# Patient Record
Sex: MALE | Race: WHITE | NOT HISPANIC OR LATINO | Employment: OTHER | URBAN - METROPOLITAN AREA
[De-identification: names, ages, dates, MRNs, and addresses within clinical notes are randomized per-mention and may not be internally consistent; named-entity substitution may affect disease eponyms.]

---

## 2024-02-05 ENCOUNTER — LAB (OUTPATIENT)
Dept: LAB | Facility: CLINIC | Age: 59
End: 2024-02-05
Payer: MEDICARE

## 2024-02-05 ENCOUNTER — OFFICE VISIT (OUTPATIENT)
Dept: HEMATOLOGY/ONCOLOGY | Facility: CLINIC | Age: 59
End: 2024-02-05
Payer: MEDICARE

## 2024-02-05 VITALS
TEMPERATURE: 98.4 F | SYSTOLIC BLOOD PRESSURE: 163 MMHG | HEIGHT: 69 IN | OXYGEN SATURATION: 97 % | WEIGHT: 171.74 LBS | DIASTOLIC BLOOD PRESSURE: 83 MMHG | HEART RATE: 79 BPM | BODY MASS INDEX: 25.44 KG/M2 | RESPIRATION RATE: 16 BRPM

## 2024-02-05 DIAGNOSIS — K21.00 GASTROESOPHAGEAL REFLUX DISEASE WITH ESOPHAGITIS WITHOUT HEMORRHAGE: ICD-10-CM

## 2024-02-05 DIAGNOSIS — E06.4 DRUG-INDUCED THYROIDITIS: ICD-10-CM

## 2024-02-05 DIAGNOSIS — N28.9 KIDNEY LESION, NATIVE, LEFT: ICD-10-CM

## 2024-02-05 DIAGNOSIS — I10 PRIMARY HYPERTENSION: ICD-10-CM

## 2024-02-05 DIAGNOSIS — C01 CANCER OF BASE OF TONGUE (MULTI): ICD-10-CM

## 2024-02-05 DIAGNOSIS — C01 CANCER OF BASE OF TONGUE (MULTI): Primary | ICD-10-CM

## 2024-02-05 DIAGNOSIS — R97.0 ELEVATED CARCINOEMBRYONIC ANTIGEN (CEA): ICD-10-CM

## 2024-02-05 LAB
ALBUMIN SERPL BCP-MCNC: 4.6 G/DL (ref 3.4–5)
ALP SERPL-CCNC: 28 U/L (ref 33–120)
ALT SERPL W P-5'-P-CCNC: 10 U/L (ref 10–52)
ANION GAP SERPL CALC-SCNC: 12 MMOL/L (ref 10–20)
AST SERPL W P-5'-P-CCNC: 14 U/L (ref 9–39)
BASOPHILS # BLD AUTO: 0.03 X10*3/UL (ref 0–0.1)
BASOPHILS NFR BLD AUTO: 0.3 %
BILIRUB SERPL-MCNC: 0.5 MG/DL (ref 0–1.2)
BUN SERPL-MCNC: 13 MG/DL (ref 6–23)
CALCIUM SERPL-MCNC: 9.9 MG/DL (ref 8.6–10.3)
CHLORIDE SERPL-SCNC: 99 MMOL/L (ref 98–107)
CO2 SERPL-SCNC: 30 MMOL/L (ref 21–32)
CREAT SERPL-MCNC: 1.59 MG/DL (ref 0.5–1.3)
EGFRCR SERPLBLD CKD-EPI 2021: 50 ML/MIN/1.73M*2
EOSINOPHIL # BLD AUTO: 0.23 X10*3/UL (ref 0–0.7)
EOSINOPHIL NFR BLD AUTO: 2.6 %
ERYTHROCYTE [DISTWIDTH] IN BLOOD BY AUTOMATED COUNT: 12.5 % (ref 11.5–14.5)
GLUCOSE SERPL-MCNC: 87 MG/DL (ref 74–99)
HCT VFR BLD AUTO: 37.8 % (ref 41–52)
HGB BLD-MCNC: 13.2 G/DL (ref 13.5–17.5)
IMM GRANULOCYTES # BLD AUTO: 0 X10*3/UL (ref 0–0.7)
IMM GRANULOCYTES NFR BLD AUTO: 0 % (ref 0–0.9)
LYMPHOCYTES # BLD AUTO: 1.45 X10*3/UL (ref 1.2–4.8)
LYMPHOCYTES NFR BLD AUTO: 16.3 %
MCH RBC QN AUTO: 34.3 PG (ref 26–34)
MCHC RBC AUTO-ENTMCNC: 34.9 G/DL (ref 32–36)
MCV RBC AUTO: 98 FL (ref 80–100)
MONOCYTES # BLD AUTO: 0.64 X10*3/UL (ref 0.1–1)
MONOCYTES NFR BLD AUTO: 7.2 %
NEUTROPHILS # BLD AUTO: 6.57 X10*3/UL (ref 1.2–7.7)
NEUTROPHILS NFR BLD AUTO: 73.6 %
PLATELET # BLD AUTO: 199 X10*3/UL (ref 150–450)
POTASSIUM SERPL-SCNC: 4 MMOL/L (ref 3.5–5.3)
PROT SERPL-MCNC: 7.3 G/DL (ref 6.4–8.2)
RBC # BLD AUTO: 3.85 X10*6/UL (ref 4.5–5.9)
SODIUM SERPL-SCNC: 137 MMOL/L (ref 136–145)
TSH SERPL-ACNC: 0.51 MIU/L (ref 0.44–3.98)
WBC # BLD AUTO: 8.9 X10*3/UL (ref 4.4–11.3)

## 2024-02-05 PROCEDURE — 85025 COMPLETE CBC W/AUTO DIFF WBC: CPT

## 2024-02-05 PROCEDURE — 3077F SYST BP >= 140 MM HG: CPT | Performed by: INTERNAL MEDICINE

## 2024-02-05 PROCEDURE — 82378 CARCINOEMBRYONIC ANTIGEN: CPT

## 2024-02-05 PROCEDURE — 36415 COLL VENOUS BLD VENIPUNCTURE: CPT

## 2024-02-05 PROCEDURE — 3079F DIAST BP 80-89 MM HG: CPT | Performed by: INTERNAL MEDICINE

## 2024-02-05 PROCEDURE — 84443 ASSAY THYROID STIM HORMONE: CPT

## 2024-02-05 PROCEDURE — 99204 OFFICE O/P NEW MOD 45 MIN: CPT | Performed by: INTERNAL MEDICINE

## 2024-02-05 PROCEDURE — 99214 OFFICE O/P EST MOD 30 MIN: CPT | Performed by: INTERNAL MEDICINE

## 2024-02-05 PROCEDURE — 84075 ASSAY ALKALINE PHOSPHATASE: CPT

## 2024-02-05 ASSESSMENT — PAIN SCALES - GENERAL: PAINLEVEL: 0-NO PAIN

## 2024-02-05 NOTE — PROGRESS NOTES
Patient ID: Chai Buitrago is a 58 y.o. male.  Referring Physician: No referring provider defined for this encounter.  Primary Care Provider: EVERETT Chamorro  Visit Type: Initial Visit      Subjective    HPI I had cancer in my tongue    Review of Systems   Constitutional:  Positive for appetite change, fatigue and unexpected weight change.   HENT:   Positive for trouble swallowing.    Eyes: Negative.    Respiratory: Negative.     Cardiovascular: Negative.    Gastrointestinal: Negative.    Endocrine: Negative.    Genitourinary: Negative.     Musculoskeletal: Negative.    Skin: Negative.    Neurological: Negative.    Hematological: Negative.    Psychiatric/Behavioral: Negative.          Objective   BSA: There is no height or weight on file to calculate BSA.  There were no vitals taken for this visit.     has no past medical history on file. PMH: hypertension, hypomagnesemia   has a past surgical history that includes Other surgical history (10/07/2019). PSH: appendectomy, portacath  No family history on file.  Oncology History    No history exists.       Chai Buitrago  has no history on file for tobacco use. 40 pack year smoking history  He  has no history on file for alcohol use. 6 cans of beer, 1-2 shots of liquor per week  He  has no history on file for drug use. + marijuana    Physical Exam  Vitals reviewed.   Constitutional:       Appearance: Normal appearance.   HENT:      Head: Normocephalic.      Mouth/Throat:      Mouth: Mucous membranes are moist.   Eyes:      Extraocular Movements: Extraocular movements intact.      Pupils: Pupils are equal, round, and reactive to light.   Neck:      Comments: Fibrotic changes secondary to radiation  Cardiovascular:      Rate and Rhythm: Normal rate and regular rhythm.   Pulmonary:      Breath sounds: Normal breath sounds.   Abdominal:      General: Bowel sounds are normal.      Palpations: Abdomen is soft.   Musculoskeletal:         General: Normal range of motion.  "  Skin:     General: Skin is warm.   Neurological:      General: No focal deficit present.      Mental Status: He is alert and oriented to person, place, and time.   Psychiatric:         Mood and Affect: Mood normal.         WBC   Date/Time Value Ref Range Status   10/03/2022 08:19 AM 15.4 (H) 4.4 - 11.3 x10E9/L Final   06/18/2021 09:51 AM 10.2 4.4 - 11.3 x10E9/L Final     No results found for: \"NRBC\"  RBC   Date Value Ref Range Status   10/03/2022 5.20 4.50 - 5.90 x10E12/L Final   06/18/2021 5.04 4.50 - 5.90 x10E12/L Final     Hemoglobin   Date Value Ref Range Status   10/03/2022 16.2 13.5 - 17.5 g/dL Final   06/18/2021 16.1 13.5 - 17.5 g/dL Final     Hematocrit   Date Value Ref Range Status   10/03/2022 48.2 41.0 - 52.0 % Final   06/18/2021 47.9 41.0 - 52.0 % Final     MCV   Date/Time Value Ref Range Status   10/03/2022 08:19 AM 93 80 - 100 fL Final   06/18/2021 09:51 AM 95 80 - 100 fL Final     No results found for: \"MCH\"  MCHC   Date/Time Value Ref Range Status   10/03/2022 08:19 AM 33.6 32.0 - 36.0 g/dL Final   06/18/2021 09:51 AM 33.6 32.0 - 36.0 g/dL Final     RDW   Date/Time Value Ref Range Status   10/03/2022 08:19 AM 12.0 11.5 - 14.5 % Final   06/18/2021 09:51 AM 12.2 11.5 - 14.5 % Final     Platelets   Date/Time Value Ref Range Status   10/03/2022 08:19  150 - 450 x10E9/L Final   06/18/2021 09:51  150 - 450 x10E9/L Final     No results found for: \"MPV\"  Neutrophils %   Date/Time Value Ref Range Status   06/18/2021 09:51 AM 64.4 40.0 - 80.0 % Final     Immature Granulocytes %, Automated   Date/Time Value Ref Range Status   06/18/2021 09:51 AM 0.2 0.0 - 0.9 % Final     Comment:      Immature Granulocyte Count (IG) includes promyelocytes,    myelocytes and metamyelocytes but does not include bands.   Percent differential counts (%) should be interpreted in the   context of the absolute cell counts (cells/L).       Lymphocytes %   Date/Time Value Ref Range Status   06/18/2021 09:51 AM 24.7 13.0 - " "44.0 % Final     Monocytes %   Date/Time Value Ref Range Status   06/18/2021 09:51 AM 6.8 2.0 - 10.0 % Final     Eosinophils %   Date/Time Value Ref Range Status   06/18/2021 09:51 AM 3.2 0.0 - 6.0 % Final     Basophils %   Date/Time Value Ref Range Status   06/18/2021 09:51 AM 0.7 0.0 - 2.0 % Final     Neutrophils Absolute   Date/Time Value Ref Range Status   06/18/2021 09:51 AM 6.56 1.20 - 7.70 x10E9/L Final     No results found for: \"IGABSOL\"  Lymphocytes Absolute   Date/Time Value Ref Range Status   06/18/2021 09:51 AM 2.51 1.20 - 4.80 x10E9/L Final     Monocytes Absolute   Date/Time Value Ref Range Status   06/18/2021 09:51 AM 0.69 0.10 - 1.00 x10E9/L Final     Eosinophils Absolute   Date/Time Value Ref Range Status   06/18/2021 09:51 AM 0.33 0.00 - 0.70 x10E9/L Final     Basophils Absolute   Date/Time Value Ref Range Status   06/18/2021 09:51 AM 0.07 0.00 - 0.10 x10E9/L Final       No components found for: \"PT\"  No results found for: \"APTT\"  Medication Documentation Review Audit    **Prior to Admission medications have not yet been reviewed**        Assessment/Plan    1) base of tongue cancer  -diagnosed in [place] with 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 in 4/2023  -treated with chemoradiation--received 7 cycles of cisplatin 40 mg/m2 weekly (1st dose 5/8/2023), radiation therapy received 5/26/2023-7/20/2023  -last followup with oncologist in [place] was on 10/30/2023-his followup PET did not show any residual disease; his creatinine was 1.7 and he was incidentally found to have a 2.7 hypoattenuating lesion in the lower pole of the left kidney without any metabolic activity--med onc did not feel comfortable ordering renal protocol CT or MRI  -he is complaining of dry mouth, sore throat, appetite still not completely 100%; slowly gaining weight back  -he has developed lymphedema in his upper neck--he will let us know if he would like a referral to " lymphedema physical therapist  -I will refer him to ENT (Dr Ramirez  -today we will check CBC, COMP, CEA and TSH  -results reviewed--wbc 8.9, hgb 13.2, plt 199,000, creatinine 1.59, calcium 9.9, AST 14, ALT 10, CEA 7.4, TSH 0.51  -will see him again in 6 months    2) hypertension  -on lisinopril    3) GERD  -on omeprazole    4) left kidney lesion  -incidentally found on imaging being performed to evaluate head and neck cancer  -as creatinine still elevated, will just perform renal ultrasound     Problem List Items Addressed This Visit    None  Visit Diagnoses         Codes    Cancer of base of tongue (CMS/HCC)    -  Primary C01    Relevant Orders    CBC and Auto Differential (Completed)    Comprehensive Metabolic Panel (Completed)    Carcinoembryonic Antigen (Completed)    TSH with reflex to Free T4 if abnormal (Completed)    Referral to ENT    Clinic Appointment Request Follow Up; JUAN LOPEZ; [facility]    Elevated carcinoembryonic antigen (CEA)     R97.0    Relevant Orders    Carcinoembryonic Antigen (Completed)    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

## 2024-02-05 NOTE — PATIENT INSTRUCTIONS
I am referring you to ENT (Dr Ramirez)    We are checking labs today, to help determine what sort of imaging study is appropriate to re-evaluate the complex cyst in your kidney    See you again in 6 months    Let us know if you would like a referral for (neck) lymphedema physical therapy

## 2024-02-06 LAB — CEA SERPL-MCNC: 7.4 UG/L

## 2024-02-17 PROBLEM — I10 PRIMARY HYPERTENSION: Status: ACTIVE | Noted: 2024-02-17

## 2024-02-17 PROBLEM — R97.0 ELEVATED CARCINOEMBRYONIC ANTIGEN (CEA): Status: ACTIVE | Noted: 2024-02-17

## 2024-02-17 PROBLEM — N28.9 KIDNEY LESION, NATIVE, LEFT: Status: ACTIVE | Noted: 2024-02-17

## 2024-02-17 PROBLEM — E06.4 DRUG-INDUCED THYROIDITIS: Status: ACTIVE | Noted: 2024-02-17

## 2024-02-17 PROBLEM — K21.00 GASTROESOPHAGEAL REFLUX DISEASE WITH ESOPHAGITIS WITHOUT HEMORRHAGE: Status: ACTIVE | Noted: 2024-02-17

## 2024-02-17 PROBLEM — C01 CANCER OF BASE OF TONGUE (MULTI): Status: ACTIVE | Noted: 2024-02-17

## 2024-02-17 ASSESSMENT — ENCOUNTER SYMPTOMS
CARDIOVASCULAR NEGATIVE: 1
FATIGUE: 1
RESPIRATORY NEGATIVE: 1
TROUBLE SWALLOWING: 1
HEMATOLOGIC/LYMPHATIC NEGATIVE: 1
APPETITE CHANGE: 1
PSYCHIATRIC NEGATIVE: 1
EYES NEGATIVE: 1
GASTROINTESTINAL NEGATIVE: 1
ENDOCRINE NEGATIVE: 1
UNEXPECTED WEIGHT CHANGE: 1
MUSCULOSKELETAL NEGATIVE: 1
NEUROLOGICAL NEGATIVE: 1

## 2024-02-19 ENCOUNTER — HOSPITAL ENCOUNTER (OUTPATIENT)
Dept: RADIOLOGY | Facility: HOSPITAL | Age: 59
Discharge: HOME | End: 2024-02-19
Payer: MEDICARE

## 2024-02-19 DIAGNOSIS — N28.9 KIDNEY LESION, NATIVE, LEFT: ICD-10-CM

## 2024-02-19 PROCEDURE — 76770 US EXAM ABDO BACK WALL COMP: CPT | Performed by: RADIOLOGY

## 2024-02-19 PROCEDURE — 76770 US EXAM ABDO BACK WALL COMP: CPT

## 2024-02-27 ENCOUNTER — APPOINTMENT (OUTPATIENT)
Dept: OTOLARYNGOLOGY | Facility: CLINIC | Age: 59
End: 2024-02-27
Payer: MEDICARE

## 2024-02-27 ENCOUNTER — APPOINTMENT (OUTPATIENT)
Dept: SPEECH THERAPY | Facility: CLINIC | Age: 59
End: 2024-02-27
Payer: MEDICARE

## 2024-04-19 ENCOUNTER — TELEPHONE (OUTPATIENT)
Dept: HEMATOLOGY/ONCOLOGY | Facility: CLINIC | Age: 59
End: 2024-04-19

## 2024-04-19 NOTE — TELEPHONE ENCOUNTER
India Lemos     This post procedure note has been dictated. Full report in imaging tab.    Jere Larose MD  6/12/2017 9:18 AM     Spoke with patient's wife- Jacey. Provided update from Dr. Lopez. Jacey had no further questions or concerns at this time.,

## 2024-05-13 ENCOUNTER — OFFICE VISIT (OUTPATIENT)
Dept: PRIMARY CARE | Facility: CLINIC | Age: 59
End: 2024-05-13
Payer: COMMERCIAL

## 2024-05-13 VITALS
HEIGHT: 69 IN | SYSTOLIC BLOOD PRESSURE: 130 MMHG | RESPIRATION RATE: 14 BRPM | WEIGHT: 186 LBS | DIASTOLIC BLOOD PRESSURE: 80 MMHG | BODY MASS INDEX: 27.55 KG/M2 | HEART RATE: 90 BPM | OXYGEN SATURATION: 95 % | TEMPERATURE: 98.6 F

## 2024-05-13 DIAGNOSIS — N18.31 STAGE 3A CHRONIC KIDNEY DISEASE (MULTI): ICD-10-CM

## 2024-05-13 DIAGNOSIS — Z00.00 PERIODIC HEALTH ASSESSMENT, GENERAL SCREENING, ADULT: Primary | ICD-10-CM

## 2024-05-13 DIAGNOSIS — C01 CANCER OF BASE OF TONGUE (MULTI): ICD-10-CM

## 2024-05-13 DIAGNOSIS — Z12.5 SCREENING FOR PROSTATE CANCER: ICD-10-CM

## 2024-05-13 DIAGNOSIS — Z12.2 ENCOUNTER FOR SCREENING FOR LUNG CANCER: ICD-10-CM

## 2024-05-13 DIAGNOSIS — I10 PRIMARY HYPERTENSION: ICD-10-CM

## 2024-05-13 DIAGNOSIS — J43.9 PULMONARY EMPHYSEMA, UNSPECIFIED EMPHYSEMA TYPE (MULTI): ICD-10-CM

## 2024-05-13 DIAGNOSIS — R79.89 ELEVATED SERUM CREATININE: ICD-10-CM

## 2024-05-13 PROCEDURE — 99396 PREV VISIT EST AGE 40-64: CPT | Performed by: INTERNAL MEDICINE

## 2024-05-13 PROCEDURE — 3075F SYST BP GE 130 - 139MM HG: CPT | Performed by: INTERNAL MEDICINE

## 2024-05-13 PROCEDURE — 3079F DIAST BP 80-89 MM HG: CPT | Performed by: INTERNAL MEDICINE

## 2024-05-13 RX ORDER — TADALAFIL 5 MG/1
5 TABLET ORAL DAILY
COMMUNITY
End: 2024-05-13 | Stop reason: SDUPTHER

## 2024-05-13 RX ORDER — TADALAFIL 5 MG/1
5 TABLET ORAL DAILY
Qty: 90 TABLET | Refills: 3 | Status: SHIPPED | OUTPATIENT
Start: 2024-05-13 | End: 2025-05-13

## 2024-05-13 ASSESSMENT — ENCOUNTER SYMPTOMS
CONSTIPATION: 0
SHORTNESS OF BREATH: 0
NAUSEA: 0
COUGH: 0
PALPITATIONS: 0
WHEEZING: 0
ABDOMINAL PAIN: 0
DIARRHEA: 0

## 2024-05-13 NOTE — PROGRESS NOTES
"Subjective   Patient ID: Chai Buitrago is a 58 y.o. male who presents for APE and npt  and Annual Exam.    Overall doing well.  Patient is fairly active.  Denies any issues with CP,SOB or dizzy spells.  No issues with anxiety, depression or sleep related problems. Denies any issues with HA, numbness or tingling.  No issues or changes with bowel or bladder habits.      Review of Systems   Respiratory:  Negative for cough, shortness of breath and wheezing.    Cardiovascular:  Negative for chest pain and palpitations.   Gastrointestinal:  Negative for abdominal pain, constipation, diarrhea and nausea.   ROS is otherwise unremarkable.       Objective   /80 (BP Location: Left arm, Patient Position: Sitting, BP Cuff Size: Adult)   Pulse 90   Temp 37 °C (98.6 °F) (Tympanic)   Resp 14   Ht 1.753 m (5' 9\")   Wt 84.4 kg (186 lb)   SpO2 95%   BMI 27.47 kg/m²     Physical Exam  Vitals reviewed.   Constitutional:       Appearance: Normal appearance.   HENT:      Head: Normocephalic.   Cardiovascular:      Rate and Rhythm: Normal rate and regular rhythm.   Pulmonary:      Effort: Pulmonary effort is normal.      Breath sounds: Normal breath sounds.   Musculoskeletal:         General: Normal range of motion.   Neurological:      General: No focal deficit present.      Mental Status: He is alert.   Psychiatric:         Mood and Affect: Mood normal.       Assessment/Plan   Problem List Items Addressed This Visit             ICD-10-CM    Cancer of base of tongue (Multi) C01    Primary hypertension I10     Other Visit Diagnoses         Codes    Periodic health assessment, general screening, adult    -  Primary Z00.00    Relevant Medications    tadalafil (Cialis) 5 mg tablet    Other Relevant Orders    CBC    Lipid Panel    Thyroid Stimulating Hormone    Hemoglobin A1C    Comprehensive Metabolic Panel    Elevated serum creatinine     R79.89    Stage 3a chronic kidney disease (Multi)     N18.31    Pulmonary emphysema, " unspecified emphysema type (Multi)     J43.9    Screening for prostate cancer     Z12.5    Relevant Orders    Prostate Specific Antigen    Encounter for screening for lung cancer     Z12.2    Relevant Orders    CT lung screening low dose        Physical exam is unremarkable.  We reviewed and discussed all the above.  We discussed current medications as well as most recent test results.  We discussed the importance and benefits of a healthy diet that is both low in sugars and low in saturated fats.  We reviewed and discussed the benefits of regular physical exercise especially when at or above a level of 150 minutes/week.  We also discussed the importance of stress management and good sleep hygiene.  We will continue to work on lifestyle improvements and follow-up in 6 months, sooner if any issues should arise.

## 2024-06-19 DIAGNOSIS — K21.00 GASTROESOPHAGEAL REFLUX DISEASE WITH ESOPHAGITIS WITHOUT HEMORRHAGE: Primary | ICD-10-CM

## 2024-06-19 RX ORDER — PANTOPRAZOLE SODIUM 40 MG/1
40 TABLET, DELAYED RELEASE ORAL DAILY
Qty: 30 TABLET | Refills: 5 | Status: SHIPPED | OUTPATIENT
Start: 2024-06-19 | End: 2024-12-16

## 2024-07-01 ENCOUNTER — OFFICE VISIT (OUTPATIENT)
Dept: OTOLARYNGOLOGY | Facility: CLINIC | Age: 59
End: 2024-07-01
Payer: COMMERCIAL

## 2024-07-01 VITALS — HEIGHT: 70 IN | BODY MASS INDEX: 28.06 KG/M2 | WEIGHT: 196 LBS

## 2024-07-01 DIAGNOSIS — C01 CANCER OF BASE OF TONGUE (MULTI): ICD-10-CM

## 2024-07-01 PROCEDURE — 31575 DIAGNOSTIC LARYNGOSCOPY: CPT | Performed by: OTOLARYNGOLOGY

## 2024-07-01 PROCEDURE — 99203 OFFICE O/P NEW LOW 30 MIN: CPT | Performed by: OTOLARYNGOLOGY

## 2024-07-02 NOTE — PROGRESS NOTES
ENT Outpatient Consultation    Chief Complaint: Establish care for base of tongue cancer.  History Of Present Illness  Chai Buitrago is a 58 y.o. male presents for evaluation of right neck pain.  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  This did start after some physical exertion however the area is more swollen than it is typically and he has pain in that location he previously had lymphadenopathy  Past Medical History  He has no past medical history on file.    Surgical History  He has a past surgical history that includes Other surgical history (10/07/2019).     Social History  He reports that he has been smoking cigarettes. He has never used smokeless tobacco. He reports current alcohol use of about 2.0 standard drinks of alcohol per week. He reports current drug use. Drug: Marijuana.    Family History  No family history on file.     Allergies  Patient has no known allergies.     Physical Exam:  CONSTITUTIONAL:  No acute distress  VOICE: Mild hoarseness  RESPIRATION:  Breathing comfortably, no stridor  CV:  No clubbing/cyanosis/edema in hands  EYES:  EOM intact, sclera normal  NEURO:  Alert and oriented times 3, Cranial nerves II-XII grossly intact and symmetric bilaterally  HEAD AND FACE:  Symmetric facial features, no masses or lesions, sinuses non-tender to palpation  SALIVARY GLANDS:  Parotid and submandibular glands normal bilaterally  EARS:  Normal external ears, external auditory canals, and TMs to otoscopy, normal hearing to whispered voice.  NOSE:  External nose midline, anterior rhinoscopy is normal with limited  "visualization to the anterior aspect of the interior turbinates, no bleeding or drainage, no lesions  ORAL CAVITY/OROPHARYNX/LIPS:  Normal mucous membranes, normal floor of mouth/tongue/OP, no masses or lesions  PHARYNGEAL WALLS:  No masses or lesions  NECK/LYMPH: Mild lymphedema of the soft tissues of the neck.  Tenderness and fullness along the right posterior lateral neck.  Difficult to palpate lymph nodes due to fibrosis no thyroid masses, trachea midline  SKIN:  Neck skin is without scar or injury  PSYCH:  Alert and oriented with appropriate mood and affect    Procedure Note: Flexible Nasolaryngoscopy  Verbal informed consent was obtained from the patient/patient's guardian. 4% lidocaine mixed with phenylephrine was prepared and dripped into the nose. It was placed in the right naris. Following an appropriate amount of time to allow for adequate anesthesia, a flexible fiberoptic nasolaryngoscope was placed into the patient's right naris. The nasal cavity, nasopharynx, oropharynx, hypopharynx, and all endolaryngeal structures were visualized and were normal except as listed below. Significant findings included:  -No obvious mucosal lesions along the base of tongue.  There is no significant epiglottis which is likely related to his prior treatment.  There is some nodularity at the base of the epiglottis attachment which may be residual cartilage.  No other obvious mucosal lesions       Last Recorded Vitals  Height 1.778 m (5' 10\"), weight 88.9 kg (196 lb).    Relevant Results  Reviewed prior notes from Dr. Lopez    Assessment and Plan  58 y.o. 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  Posttreatment PET scan was negative    -New development of pain and swelling at the site of previous lymphadenopathy.  Will get a CT scan of the neck with contrast to fully evaluate  -Advised him that I would " recommend follow-up in 3 to 4 months for regular surveillance.  I may recommend earlier follow-up depending on results of CT scan.    Christian Ramirez MD

## 2024-07-16 ENCOUNTER — HOSPITAL ENCOUNTER (OUTPATIENT)
Dept: RADIOLOGY | Facility: HOSPITAL | Age: 59
Discharge: HOME | End: 2024-07-16
Payer: COMMERCIAL

## 2024-07-16 DIAGNOSIS — C01 CANCER OF BASE OF TONGUE (MULTI): ICD-10-CM

## 2024-07-16 DIAGNOSIS — Z00.00 PERIODIC HEALTH ASSESSMENT, GENERAL SCREENING, ADULT: ICD-10-CM

## 2024-07-16 DIAGNOSIS — Z12.5 SCREENING FOR PROSTATE CANCER: ICD-10-CM

## 2024-07-16 LAB
ALBUMIN SERPL BCP-MCNC: 3.9 G/DL (ref 3.4–5)
ALP SERPL-CCNC: 30 U/L (ref 33–120)
ALT SERPL W P-5'-P-CCNC: 11 U/L (ref 10–52)
ANION GAP SERPL CALC-SCNC: 8 MMOL/L (ref 10–20)
AST SERPL W P-5'-P-CCNC: 13 U/L (ref 9–39)
BILIRUB SERPL-MCNC: 0.4 MG/DL (ref 0–1.2)
BUN SERPL-MCNC: 12 MG/DL (ref 6–23)
CALCIUM SERPL-MCNC: 8.7 MG/DL (ref 8.6–10.3)
CHLORIDE SERPL-SCNC: 103 MMOL/L (ref 98–107)
CHOLEST SERPL-MCNC: 197 MG/DL (ref 0–199)
CHOLESTEROL/HDL RATIO: 6.2
CO2 SERPL-SCNC: 28 MMOL/L (ref 21–32)
CREAT SERPL-MCNC: 1.63 MG/DL (ref 0.5–1.3)
EGFRCR SERPLBLD CKD-EPI 2021: 49 ML/MIN/1.73M*2
ERYTHROCYTE [DISTWIDTH] IN BLOOD BY AUTOMATED COUNT: 12.4 % (ref 11.5–14.5)
EST. AVERAGE GLUCOSE BLD GHB EST-MCNC: 103 MG/DL
GLUCOSE SERPL-MCNC: 91 MG/DL (ref 74–99)
HBA1C MFR BLD: 5.2 %
HCT VFR BLD AUTO: 37.4 % (ref 41–52)
HDLC SERPL-MCNC: 31.8 MG/DL
HGB BLD-MCNC: 13 G/DL (ref 13.5–17.5)
HOLD SPECIMEN: NORMAL
LDLC SERPL CALC-MCNC: 118 MG/DL
MCH RBC QN AUTO: 33.1 PG (ref 26–34)
MCHC RBC AUTO-ENTMCNC: 34.8 G/DL (ref 32–36)
MCV RBC AUTO: 95 FL (ref 80–100)
NON HDL CHOLESTEROL: 165 MG/DL (ref 0–149)
NRBC BLD-RTO: 0 /100 WBCS (ref 0–0)
PLATELET # BLD AUTO: 159 X10*3/UL (ref 150–450)
POTASSIUM SERPL-SCNC: 4.4 MMOL/L (ref 3.5–5.3)
PROT SERPL-MCNC: 6 G/DL (ref 6.4–8.2)
PSA SERPL-MCNC: 0.48 NG/ML
RBC # BLD AUTO: 3.93 X10*6/UL (ref 4.5–5.9)
SODIUM SERPL-SCNC: 135 MMOL/L (ref 136–145)
TRIGL SERPL-MCNC: 235 MG/DL (ref 0–149)
TSH SERPL-ACNC: 0.64 MIU/L (ref 0.44–3.98)
VLDL: 47 MG/DL (ref 0–40)
WBC # BLD AUTO: 6.2 X10*3/UL (ref 4.4–11.3)

## 2024-07-16 PROCEDURE — 80053 COMPREHEN METABOLIC PANEL: CPT | Performed by: INTERNAL MEDICINE

## 2024-07-16 PROCEDURE — 36415 COLL VENOUS BLD VENIPUNCTURE: CPT | Performed by: INTERNAL MEDICINE

## 2024-07-16 PROCEDURE — 70491 CT SOFT TISSUE NECK W/DYE: CPT | Performed by: RADIOLOGY

## 2024-07-16 PROCEDURE — 80061 LIPID PANEL: CPT | Performed by: INTERNAL MEDICINE

## 2024-07-16 PROCEDURE — 84153 ASSAY OF PSA TOTAL: CPT | Performed by: INTERNAL MEDICINE

## 2024-07-16 PROCEDURE — 83036 HEMOGLOBIN GLYCOSYLATED A1C: CPT | Mod: ELYLAB | Performed by: INTERNAL MEDICINE

## 2024-07-16 PROCEDURE — 2550000001 HC RX 255 CONTRASTS

## 2024-07-16 PROCEDURE — 85027 COMPLETE CBC AUTOMATED: CPT | Performed by: INTERNAL MEDICINE

## 2024-07-16 PROCEDURE — 84443 ASSAY THYROID STIM HORMONE: CPT | Performed by: INTERNAL MEDICINE

## 2024-07-16 PROCEDURE — 70491 CT SOFT TISSUE NECK W/DYE: CPT

## 2024-07-22 ENCOUNTER — APPOINTMENT (OUTPATIENT)
Dept: OTOLARYNGOLOGY | Facility: CLINIC | Age: 59
End: 2024-07-22
Payer: COMMERCIAL

## 2024-07-25 DIAGNOSIS — I65.23 ATHEROSCLEROSIS OF BOTH CAROTID ARTERIES: Primary | ICD-10-CM

## 2024-08-05 ENCOUNTER — APPOINTMENT (OUTPATIENT)
Dept: HEMATOLOGY/ONCOLOGY | Facility: CLINIC | Age: 59
End: 2024-08-05
Payer: COMMERCIAL

## 2024-08-05 ENCOUNTER — OFFICE VISIT (OUTPATIENT)
Dept: HEMATOLOGY/ONCOLOGY | Facility: CLINIC | Age: 59
End: 2024-08-05
Payer: COMMERCIAL

## 2024-08-05 VITALS
WEIGHT: 190.04 LBS | DIASTOLIC BLOOD PRESSURE: 85 MMHG | OXYGEN SATURATION: 96 % | TEMPERATURE: 98.4 F | BODY MASS INDEX: 27.27 KG/M2 | SYSTOLIC BLOOD PRESSURE: 149 MMHG | RESPIRATION RATE: 16 BRPM | HEART RATE: 72 BPM

## 2024-08-05 DIAGNOSIS — C01 CANCER OF BASE OF TONGUE (MULTI): Primary | ICD-10-CM

## 2024-08-05 DIAGNOSIS — K21.00 GASTROESOPHAGEAL REFLUX DISEASE WITH ESOPHAGITIS WITHOUT HEMORRHAGE: ICD-10-CM

## 2024-08-05 DIAGNOSIS — N28.9 KIDNEY LESION, NATIVE, LEFT: ICD-10-CM

## 2024-08-05 DIAGNOSIS — I10 PRIMARY HYPERTENSION: ICD-10-CM

## 2024-08-05 PROCEDURE — 3079F DIAST BP 80-89 MM HG: CPT | Performed by: INTERNAL MEDICINE

## 2024-08-05 PROCEDURE — 99214 OFFICE O/P EST MOD 30 MIN: CPT | Performed by: INTERNAL MEDICINE

## 2024-08-05 PROCEDURE — 3077F SYST BP >= 140 MM HG: CPT | Performed by: INTERNAL MEDICINE

## 2024-08-05 ASSESSMENT — PAIN SCALES - GENERAL: PAINLEVEL: 0-NO PAIN

## 2024-08-05 NOTE — PROGRESS NOTES
Patient ID: Chai Buitrago is a 58 y.o. male.  Referring Physician: No referring provider defined for this encounter.  Primary Care Provider: Ricco Arizmendi DO  Visit Type: Follow Up      Subjective    HPI  Can you double check on the recent CT scan I just had?    Review of Systems   Constitutional: Negative.    HENT:  Negative.     Eyes: Negative.    Respiratory: Negative.     Cardiovascular: Negative.    Gastrointestinal: Negative.    Endocrine: Negative.    Genitourinary: Negative.     Musculoskeletal: Negative.    Skin: Negative.    Neurological: Negative.    Hematological: Negative.    Psychiatric/Behavioral: Negative.          Objective   BSA: 2.06 meters squared  /85 (BP Location: Right arm)   Pulse 72   Temp 36.9 °C (98.4 °F) (Temporal)   Resp 16   Wt 86.2 kg (190 lb 0.6 oz)   SpO2 96%   BMI 27.27 kg/m²      has no past medical history on file.   has a past surgical history that includes Other surgical history (10/07/2019).  No family history on file.  Oncology History    No history exists.       Chai Buitrago  reports that he has been smoking cigarettes. He has never used smokeless tobacco.  He  reports current alcohol use of about 2.0 standard drinks of alcohol per week.  He  reports current drug use. Drug: Marijuana.    Physical Exam  Vitals reviewed.   Constitutional:       Appearance: Normal appearance.   HENT:      Head: Normocephalic.      Mouth/Throat:      Mouth: Mucous membranes are moist.   Eyes:      Extraocular Movements: Extraocular movements intact.      Pupils: Pupils are equal, round, and reactive to light.   Cardiovascular:      Rate and Rhythm: Normal rate and regular rhythm.      Heart sounds: Normal heart sounds.   Pulmonary:      Breath sounds: Normal breath sounds.   Abdominal:      General: Bowel sounds are normal.      Palpations: Abdomen is soft.   Musculoskeletal:         General: Normal range of motion.      Cervical back: Normal range of motion and neck supple.    Skin:     General: Skin is warm.   Neurological:      General: No focal deficit present.      Mental Status: He is alert and oriented to person, place, and time.   Psychiatric:         Mood and Affect: Mood normal.         Behavior: Behavior normal.         WBC   Date/Time Value Ref Range Status   07/16/2024 10:53 AM 6.2 4.4 - 11.3 x10*3/uL Final   02/05/2024 02:25 PM 8.9 4.4 - 11.3 x10*3/uL Final   10/03/2022 08:19 AM 15.4 (H) 4.4 - 11.3 x10E9/L Final   06/18/2021 09:51 AM 10.2 4.4 - 11.3 x10E9/L Final     nRBC   Date Value Ref Range Status   07/16/2024 0.0 0.0 - 0.0 /100 WBCs Final     RBC   Date Value Ref Range Status   07/16/2024 3.93 (L) 4.50 - 5.90 x10*6/uL Final   02/05/2024 3.85 (L) 4.50 - 5.90 x10*6/uL Final   10/03/2022 5.20 4.50 - 5.90 x10E12/L Final   06/18/2021 5.04 4.50 - 5.90 x10E12/L Final     Hemoglobin   Date Value Ref Range Status   07/16/2024 13.0 (L) 13.5 - 17.5 g/dL Final   02/05/2024 13.2 (L) 13.5 - 17.5 g/dL Final   10/03/2022 16.2 13.5 - 17.5 g/dL Final   06/18/2021 16.1 13.5 - 17.5 g/dL Final     Hematocrit   Date Value Ref Range Status   07/16/2024 37.4 (L) 41.0 - 52.0 % Final   02/05/2024 37.8 (L) 41.0 - 52.0 % Final   10/03/2022 48.2 41.0 - 52.0 % Final   06/18/2021 47.9 41.0 - 52.0 % Final     MCV   Date/Time Value Ref Range Status   07/16/2024 10:53 AM 95 80 - 100 fL Final   02/05/2024 02:25 PM 98 80 - 100 fL Final   10/03/2022 08:19 AM 93 80 - 100 fL Final   06/18/2021 09:51 AM 95 80 - 100 fL Final     MCH   Date/Time Value Ref Range Status   07/16/2024 10:53 AM 33.1 26.0 - 34.0 pg Final   02/05/2024 02:25 PM 34.3 (H) 26.0 - 34.0 pg Final     MCHC   Date/Time Value Ref Range Status   07/16/2024 10:53 AM 34.8 32.0 - 36.0 g/dL Final   02/05/2024 02:25 PM 34.9 32.0 - 36.0 g/dL Final   10/03/2022 08:19 AM 33.6 32.0 - 36.0 g/dL Final   06/18/2021 09:51 AM 33.6 32.0 - 36.0 g/dL Final     RDW   Date/Time Value Ref Range Status   07/16/2024 10:53 AM 12.4 11.5 - 14.5 % Final   02/05/2024  "02:25 PM 12.5 11.5 - 14.5 % Final   10/03/2022 08:19 AM 12.0 11.5 - 14.5 % Final   06/18/2021 09:51 AM 12.2 11.5 - 14.5 % Final     Platelets   Date/Time Value Ref Range Status   07/16/2024 10:53  150 - 450 x10*3/uL Final   02/05/2024 02:25  150 - 450 x10*3/uL Final   10/03/2022 08:19  150 - 450 x10E9/L Final   06/18/2021 09:51  150 - 450 x10E9/L Final     No results found for: \"MPV\"  Neutrophils %   Date/Time Value Ref Range Status   02/05/2024 02:25 PM 73.6 40.0 - 80.0 % Final   06/18/2021 09:51 AM 64.4 40.0 - 80.0 % Final     Immature Granulocytes %, Automated   Date/Time Value Ref Range Status   02/05/2024 02:25 PM 0.0 0.0 - 0.9 % Final     Comment:     Immature Granulocyte Count (IG) includes promyelocytes, myelocytes and metamyelocytes but does not include bands. Percent differential counts (%) should be interpreted in the context of the absolute cell counts (cells/UL).   06/18/2021 09:51 AM 0.2 0.0 - 0.9 % Final     Comment:      Immature Granulocyte Count (IG) includes promyelocytes,    myelocytes and metamyelocytes but does not include bands.   Percent differential counts (%) should be interpreted in the   context of the absolute cell counts (cells/L).       Lymphocytes %   Date/Time Value Ref Range Status   02/05/2024 02:25 PM 16.3 13.0 - 44.0 % Final   06/18/2021 09:51 AM 24.7 13.0 - 44.0 % Final     Monocytes %   Date/Time Value Ref Range Status   02/05/2024 02:25 PM 7.2 2.0 - 10.0 % Final   06/18/2021 09:51 AM 6.8 2.0 - 10.0 % Final     Eosinophils %   Date/Time Value Ref Range Status   02/05/2024 02:25 PM 2.6 0.0 - 6.0 % Final   06/18/2021 09:51 AM 3.2 0.0 - 6.0 % Final     Basophils %   Date/Time Value Ref Range Status   02/05/2024 02:25 PM 0.3 0.0 - 2.0 % Final   06/18/2021 09:51 AM 0.7 0.0 - 2.0 % Final     Neutrophils Absolute   Date/Time Value Ref Range Status   02/05/2024 02:25 PM 6.57 1.20 - 7.70 x10*3/uL Final     Comment:     Percent differential counts (%) should be " "interpreted in the context of the absolute cell counts (cells/uL).   06/18/2021 09:51 AM 6.56 1.20 - 7.70 x10E9/L Final     Immature Granulocytes Absolute, Automated   Date/Time Value Ref Range Status   02/05/2024 02:25 PM 0.00 0.00 - 0.70 x10*3/uL Final     Lymphocytes Absolute   Date/Time Value Ref Range Status   02/05/2024 02:25 PM 1.45 1.20 - 4.80 x10*3/uL Final   06/18/2021 09:51 AM 2.51 1.20 - 4.80 x10E9/L Final     Monocytes Absolute   Date/Time Value Ref Range Status   02/05/2024 02:25 PM 0.64 0.10 - 1.00 x10*3/uL Final   06/18/2021 09:51 AM 0.69 0.10 - 1.00 x10E9/L Final     Eosinophils Absolute   Date/Time Value Ref Range Status   02/05/2024 02:25 PM 0.23 0.00 - 0.70 x10*3/uL Final   06/18/2021 09:51 AM 0.33 0.00 - 0.70 x10E9/L Final     Basophils Absolute   Date/Time Value Ref Range Status   02/05/2024 02:25 PM 0.03 0.00 - 0.10 x10*3/uL Final   06/18/2021 09:51 AM 0.07 0.00 - 0.10 x10E9/L Final       No components found for: \"PT\"  No results found for: \"APTT\"  Medication Documentation Review Audit       Reviewed by Michelle Lopez MA (Medical Assistant) on 08/05/24 at 1111      Medication Order Taking? Sig Documenting Provider Last Dose Status   pantoprazole (ProtoNix) 40 mg EC tablet [id number] Yes Take 1 tablet (40 mg) by mouth once daily. Do not crush, chew, or split. Ricco Arizmendi, DO Taking Active   tadalafil (Cialis) 5 mg tablet [id number] Yes Take 1 tablet (5 mg) by mouth once daily. 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 in 4/2023  -treated with chemoradiation--received 7 cycles of cisplatin 40 mg/m2 weekly (1st dose 5/8/2023), radiation therapy received 5/26/2023-7/20/2023  -last followup with oncologist in [place] was on 10/30/2023-his followup PET did not show any residual disease; his creatinine was 1.7 and he " was incidentally found to have a 2.7 hypoattenuating lesion in the lower pole of the left kidney without any metabolic activity--med onc did not feel comfortable ordering renal protocol CT or MRI  -he continues to report dry mouth, sore throat, appetite still not completely 100%; weight stable  -he saw Dr Ramirez on 7/1/2024-flex nasolaryngoscopy was done--no mucosal lesions along base of tongue; no significant epiglottis which is likely related to his prior treatment; some nodularity at the base of the epiglottis attachment which may be residual cartilage  -CT neck was done on 7/16/2024 showing evaluation oral cavity is limited by streak artifact from dental hardware (I reviewed images myself and do not see the streak artifact, and Chai says he does not have any dental hardware in place); nasopharyngeal and oropharyngeal structures are unremarkable; epiglottis is not identified; soft tissues in this region along the false cords are edematous (post-radiation changes); retropharyngeal and prevertebral soft tissues unremarkable; there are few non specific bilateral neck nodes reactive in etiology; common carotid bifurcations have non stenotic atherosclerotic calcifications bilaterally; thyroid unremarkable; bilateral parotid and submandibular glands unremarkable; paranasal sinuses and bilateral mastoids are clear  -he was advised by Dr Ramirez to followup with vascular regarding his carotids  -labs done on 7/16/2024 included CBC, COMP, TSH  -results reviewed--wbc 6.2, hgb 13.0, plt 159,000, creatinine 1.63, calcium 8.7, AST 13, ALT 11, TSH 0.64  -will see him again in 6 months     2) hypertension  -on lisinopril     3) GERD  -on omeprazole     4) left kidney lesion  -incidentally found on imaging being performed to evaluate head and neck cancer  -as creatinine still elevated, will just perform renal ultrasound  -renal US done on 2/19/2024 showed right kidney 10.7 cm in length, renal cortical echogenicity is within  normal limits; no hydronephrosis; no nephrolithiasis; 0.9 cm cyst; left kidney 11.2 cm in length, renal cortical echogenicity is within normal limits; no hydronephrosis; no evidence of nephrolithiasis; multiple renal cysts, largest 2.4 x 1.9 x 2.1 cm  -will recheck renal US 2/2025     Problem List Items Addressed This Visit             ICD-10-CM    Cancer of base of tongue (Multi) - Primary C01    Relevant Orders    Clinic Appointment Request Follow Up; JUAN LOPEZ; [facility]    CBC and Auto Differential    Comprehensive metabolic panel    CEA    Kidney lesion, native, left N28.9    Relevant Orders    US renal complete            Juan Lopez MD

## 2024-08-06 ASSESSMENT — ENCOUNTER SYMPTOMS
MUSCULOSKELETAL NEGATIVE: 1
RESPIRATORY NEGATIVE: 1
GASTROINTESTINAL NEGATIVE: 1
EYES NEGATIVE: 1
CARDIOVASCULAR NEGATIVE: 1
NEUROLOGICAL NEGATIVE: 1
CONSTITUTIONAL NEGATIVE: 1
PSYCHIATRIC NEGATIVE: 1
HEMATOLOGIC/LYMPHATIC NEGATIVE: 1
ENDOCRINE NEGATIVE: 1

## 2024-10-04 ENCOUNTER — APPOINTMENT (OUTPATIENT)
Dept: OTOLARYNGOLOGY | Facility: CLINIC | Age: 59
End: 2024-10-04
Payer: COMMERCIAL

## 2024-11-18 ENCOUNTER — APPOINTMENT (OUTPATIENT)
Dept: PRIMARY CARE | Facility: CLINIC | Age: 59
End: 2024-11-18
Payer: COMMERCIAL

## 2025-01-30 ENCOUNTER — HOSPITAL ENCOUNTER (OUTPATIENT)
Dept: RADIOLOGY | Facility: HOSPITAL | Age: 60
Discharge: HOME | End: 2025-01-30
Payer: COMMERCIAL

## 2025-01-30 DIAGNOSIS — N28.9 KIDNEY LESION, NATIVE, LEFT: ICD-10-CM

## 2025-01-30 PROCEDURE — 76770 US EXAM ABDO BACK WALL COMP: CPT

## 2025-01-30 PROCEDURE — 76770 US EXAM ABDO BACK WALL COMP: CPT | Performed by: RADIOLOGY

## 2025-02-03 ENCOUNTER — OFFICE VISIT (OUTPATIENT)
Dept: HEMATOLOGY/ONCOLOGY | Facility: CLINIC | Age: 60
End: 2025-02-03
Payer: COMMERCIAL

## 2025-02-03 ENCOUNTER — LAB (OUTPATIENT)
Dept: LAB | Facility: CLINIC | Age: 60
End: 2025-02-03
Payer: COMMERCIAL

## 2025-02-03 VITALS
OXYGEN SATURATION: 94 % | WEIGHT: 199.96 LBS | DIASTOLIC BLOOD PRESSURE: 91 MMHG | RESPIRATION RATE: 18 BRPM | SYSTOLIC BLOOD PRESSURE: 155 MMHG | HEART RATE: 86 BPM | BODY MASS INDEX: 28.69 KG/M2 | TEMPERATURE: 97.7 F

## 2025-02-03 DIAGNOSIS — C01 CANCER OF BASE OF TONGUE (MULTI): ICD-10-CM

## 2025-02-03 DIAGNOSIS — C01 CANCER OF BASE OF TONGUE (MULTI): Primary | ICD-10-CM

## 2025-02-03 DIAGNOSIS — N28.9 KIDNEY LESION, NATIVE, LEFT: ICD-10-CM

## 2025-02-03 DIAGNOSIS — K21.00 GASTROESOPHAGEAL REFLUX DISEASE WITH ESOPHAGITIS WITHOUT HEMORRHAGE: ICD-10-CM

## 2025-02-03 DIAGNOSIS — I10 PRIMARY HYPERTENSION: ICD-10-CM

## 2025-02-03 LAB
ALBUMIN SERPL BCP-MCNC: 4.5 G/DL (ref 3.4–5)
ALP SERPL-CCNC: 33 U/L (ref 33–120)
ALT SERPL W P-5'-P-CCNC: 18 U/L (ref 10–52)
ANION GAP SERPL CALC-SCNC: 14 MMOL/L (ref 10–20)
AST SERPL W P-5'-P-CCNC: 16 U/L (ref 9–39)
BASOPHILS # BLD AUTO: 0.06 X10*3/UL (ref 0–0.1)
BASOPHILS NFR BLD AUTO: 0.8 %
BILIRUB SERPL-MCNC: 0.4 MG/DL (ref 0–1.2)
BUN SERPL-MCNC: 14 MG/DL (ref 6–23)
CALCIUM SERPL-MCNC: 9.1 MG/DL (ref 8.6–10.3)
CEA SERPL-MCNC: 7.5 UG/L
CHLORIDE SERPL-SCNC: 102 MMOL/L (ref 98–107)
CO2 SERPL-SCNC: 27 MMOL/L (ref 21–32)
CREAT SERPL-MCNC: 1.8 MG/DL (ref 0.5–1.3)
EGFRCR SERPLBLD CKD-EPI 2021: 43 ML/MIN/1.73M*2
EOSINOPHIL # BLD AUTO: 0.15 X10*3/UL (ref 0–0.7)
EOSINOPHIL NFR BLD AUTO: 1.9 %
ERYTHROCYTE [DISTWIDTH] IN BLOOD BY AUTOMATED COUNT: 12.7 % (ref 11.5–14.5)
GLUCOSE SERPL-MCNC: 148 MG/DL (ref 74–99)
HCT VFR BLD AUTO: 41.3 % (ref 41–52)
HGB BLD-MCNC: 14.4 G/DL (ref 13.5–17.5)
IMM GRANULOCYTES # BLD AUTO: 0.01 X10*3/UL (ref 0–0.7)
IMM GRANULOCYTES NFR BLD AUTO: 0.1 % (ref 0–0.9)
LYMPHOCYTES # BLD AUTO: 1.1 X10*3/UL (ref 1.2–4.8)
LYMPHOCYTES NFR BLD AUTO: 14.1 %
MCH RBC QN AUTO: 32.4 PG (ref 26–34)
MCHC RBC AUTO-ENTMCNC: 34.9 G/DL (ref 32–36)
MCV RBC AUTO: 93 FL (ref 80–100)
MONOCYTES # BLD AUTO: 0.5 X10*3/UL (ref 0.1–1)
MONOCYTES NFR BLD AUTO: 6.4 %
NEUTROPHILS # BLD AUTO: 6 X10*3/UL (ref 1.2–7.7)
NEUTROPHILS NFR BLD AUTO: 76.7 %
PLATELET # BLD AUTO: 179 X10*3/UL (ref 150–450)
POTASSIUM SERPL-SCNC: 3.9 MMOL/L (ref 3.5–5.3)
PROT SERPL-MCNC: 6.7 G/DL (ref 6.4–8.2)
RBC # BLD AUTO: 4.44 X10*6/UL (ref 4.5–5.9)
SODIUM SERPL-SCNC: 139 MMOL/L (ref 136–145)
WBC # BLD AUTO: 7.8 X10*3/UL (ref 4.4–11.3)

## 2025-02-03 PROCEDURE — 99214 OFFICE O/P EST MOD 30 MIN: CPT | Performed by: INTERNAL MEDICINE

## 2025-02-03 PROCEDURE — 3080F DIAST BP >= 90 MM HG: CPT | Performed by: INTERNAL MEDICINE

## 2025-02-03 PROCEDURE — 84075 ASSAY ALKALINE PHOSPHATASE: CPT

## 2025-02-03 PROCEDURE — 3077F SYST BP >= 140 MM HG: CPT | Performed by: INTERNAL MEDICINE

## 2025-02-03 PROCEDURE — 36415 COLL VENOUS BLD VENIPUNCTURE: CPT

## 2025-02-03 PROCEDURE — 85025 COMPLETE CBC W/AUTO DIFF WBC: CPT

## 2025-02-03 PROCEDURE — 82378 CARCINOEMBRYONIC ANTIGEN: CPT

## 2025-02-03 ASSESSMENT — ENCOUNTER SYMPTOMS
CONSTITUTIONAL NEGATIVE: 1
PSYCHIATRIC NEGATIVE: 1
RESPIRATORY NEGATIVE: 1
ENDOCRINE NEGATIVE: 1
HEMATOLOGIC/LYMPHATIC NEGATIVE: 1
MUSCULOSKELETAL NEGATIVE: 1
NEUROLOGICAL NEGATIVE: 1
EYES NEGATIVE: 1
CARDIOVASCULAR NEGATIVE: 1
GASTROINTESTINAL NEGATIVE: 1

## 2025-02-03 ASSESSMENT — PAIN SCALES - GENERAL: PAINLEVEL_OUTOF10: 0-NO PAIN

## 2025-02-03 NOTE — PROGRESS NOTES
Patient ID: Chai Buitrago is a 59 y.o. male.  Referring Physician: Juan Lopez MD  [id number]  [address]  [place] [address]  Primary Care Provider: Ricco Arizmendi DO  Visit Type: Follow Up      Subjective    HPI How was my ultrasound?    Review of Systems   Constitutional: Negative.    HENT:  Negative.     Eyes: Negative.    Respiratory: Negative.     Cardiovascular: Negative.    Gastrointestinal: Negative.    Endocrine: Negative.    Genitourinary: Negative.     Musculoskeletal: Negative.    Skin: Negative.    Neurological: Negative.    Hematological: Negative.    Psychiatric/Behavioral: Negative.          Objective   BSA: 2.12 meters squared  BP (!) 155/91 (BP Location: Left arm)   Pulse 86   Temp 36.5 °C (97.7 °F) (Temporal)   Resp 18   Wt 90.7 kg (199 lb 15.3 oz)   SpO2 94%   BMI 28.69 kg/m²      has no past medical history on file.   has a past surgical history that includes Other surgical history (10/07/2019).  No family history on file.  Oncology History    No history exists.       Chai Buitrago  reports that he has been smoking cigarettes. He has never used smokeless tobacco.  He  reports current alcohol use of about 2.0 standard drinks of alcohol per week.  He  reports current drug use. Drug: Marijuana.    Physical Exam  Vitals reviewed.   Constitutional:       Appearance: Normal appearance.   HENT:      Head: Normocephalic.      Mouth/Throat:      Mouth: Mucous membranes are moist.   Eyes:      Extraocular Movements: Extraocular movements intact.      Pupils: Pupils are equal, round, and reactive to light.   Cardiovascular:      Rate and Rhythm: Normal rate and regular rhythm.      Pulses: Normal pulses.      Heart sounds: Normal heart sounds.   Pulmonary:      Effort: Pulmonary effort is normal.      Breath sounds: Normal breath sounds.   Abdominal:      General: Bowel sounds are normal.      Palpations: Abdomen is soft.   Musculoskeletal:         General: Normal range of motion.       Cervical back: Normal range of motion and neck supple.   Skin:     General: Skin is warm.   Neurological:      General: No focal deficit present.      Mental Status: He is alert and oriented to person, place, and time.   Psychiatric:         Mood and Affect: Mood normal.         Behavior: Behavior normal.         WBC   Date/Time Value Ref Range Status   02/03/2025 12:04 PM 7.8 4.4 - 11.3 x10*3/uL Final   07/16/2024 10:53 AM 6.2 4.4 - 11.3 x10*3/uL Final   02/05/2024 02:25 PM 8.9 4.4 - 11.3 x10*3/uL Final     nRBC   Date Value Ref Range Status   07/16/2024 0.0 0.0 - 0.0 /100 WBCs Final     RBC   Date Value Ref Range Status   02/03/2025 4.44 (L) 4.50 - 5.90 x10*6/uL Final   07/16/2024 3.93 (L) 4.50 - 5.90 x10*6/uL Final   02/05/2024 3.85 (L) 4.50 - 5.90 x10*6/uL Final     Hemoglobin   Date Value Ref Range Status   02/03/2025 14.4 13.5 - 17.5 g/dL Final   07/16/2024 13.0 (L) 13.5 - 17.5 g/dL Final   02/05/2024 13.2 (L) 13.5 - 17.5 g/dL Final     Hematocrit   Date Value Ref Range Status   02/03/2025 41.3 41.0 - 52.0 % Final   07/16/2024 37.4 (L) 41.0 - 52.0 % Final   02/05/2024 37.8 (L) 41.0 - 52.0 % Final     MCV   Date/Time Value Ref Range Status   02/03/2025 12:04 PM 93 80 - 100 fL Final   07/16/2024 10:53 AM 95 80 - 100 fL Final   02/05/2024 02:25 PM 98 80 - 100 fL Final     MCH   Date/Time Value Ref Range Status   02/03/2025 12:04 PM 32.4 26.0 - 34.0 pg Final   07/16/2024 10:53 AM 33.1 26.0 - 34.0 pg Final   02/05/2024 02:25 PM 34.3 (H) 26.0 - 34.0 pg Final     MCHC   Date/Time Value Ref Range Status   02/03/2025 12:04 PM 34.9 32.0 - 36.0 g/dL Final   07/16/2024 10:53 AM 34.8 32.0 - 36.0 g/dL Final   02/05/2024 02:25 PM 34.9 32.0 - 36.0 g/dL Final     RDW   Date/Time Value Ref Range Status   02/03/2025 12:04 PM 12.7 11.5 - 14.5 % Final   07/16/2024 10:53 AM 12.4 11.5 - 14.5 % Final   02/05/2024 02:25 PM 12.5 11.5 - 14.5 % Final     Platelets   Date/Time Value Ref Range Status   02/03/2025 12:04  150 - 450  "x10*3/uL Final   07/16/2024 10:53  150 - 450 x10*3/uL Final   02/05/2024 02:25  150 - 450 x10*3/uL Final     No results found for: \"MPV\"  Neutrophils %   Date/Time Value Ref Range Status   02/03/2025 12:04 PM 76.7 40.0 - 80.0 % Final   02/05/2024 02:25 PM 73.6 40.0 - 80.0 % Final   06/18/2021 09:51 AM 64.4 40.0 - 80.0 % Final     Immature Granulocytes %, Automated   Date/Time Value Ref Range Status   02/03/2025 12:04 PM 0.1 0.0 - 0.9 % Final     Comment:     Immature Granulocyte Count (IG) includes promyelocytes, myelocytes and metamyelocytes but does not include bands. Percent differential counts (%) should be interpreted in the context of the absolute cell counts (cells/UL).   02/05/2024 02:25 PM 0.0 0.0 - 0.9 % Final     Comment:     Immature Granulocyte Count (IG) includes promyelocytes, myelocytes and metamyelocytes but does not include bands. Percent differential counts (%) should be interpreted in the context of the absolute cell counts (cells/UL).   06/18/2021 09:51 AM 0.2 0.0 - 0.9 % Final     Comment:      Immature Granulocyte Count (IG) includes promyelocytes,    myelocytes and metamyelocytes but does not include bands.   Percent differential counts (%) should be interpreted in the   context of the absolute cell counts (cells/L).       Lymphocytes %   Date/Time Value Ref Range Status   02/03/2025 12:04 PM 14.1 13.0 - 44.0 % Final   02/05/2024 02:25 PM 16.3 13.0 - 44.0 % Final   06/18/2021 09:51 AM 24.7 13.0 - 44.0 % Final     Monocytes %   Date/Time Value Ref Range Status   02/03/2025 12:04 PM 6.4 2.0 - 10.0 % Final   02/05/2024 02:25 PM 7.2 2.0 - 10.0 % Final   06/18/2021 09:51 AM 6.8 2.0 - 10.0 % Final     Eosinophils %   Date/Time Value Ref Range Status   02/03/2025 12:04 PM 1.9 0.0 - 6.0 % Final   02/05/2024 02:25 PM 2.6 0.0 - 6.0 % Final   06/18/2021 09:51 AM 3.2 0.0 - 6.0 % Final     Basophils %   Date/Time Value Ref Range Status   02/03/2025 12:04 PM 0.8 0.0 - 2.0 % Final   02/05/2024 " "02:25 PM 0.3 0.0 - 2.0 % Final   06/18/2021 09:51 AM 0.7 0.0 - 2.0 % Final     Neutrophils Absolute   Date/Time Value Ref Range Status   02/03/2025 12:04 PM 6.00 1.20 - 7.70 x10*3/uL Final     Comment:     Percent differential counts (%) should be interpreted in the context of the absolute cell counts (cells/uL).   02/05/2024 02:25 PM 6.57 1.20 - 7.70 x10*3/uL Final     Comment:     Percent differential counts (%) should be interpreted in the context of the absolute cell counts (cells/uL).   06/18/2021 09:51 AM 6.56 1.20 - 7.70 x10E9/L Final     Immature Granulocytes Absolute, Automated   Date/Time Value Ref Range Status   02/03/2025 12:04 PM 0.01 0.00 - 0.70 x10*3/uL Final   02/05/2024 02:25 PM 0.00 0.00 - 0.70 x10*3/uL Final     Lymphocytes Absolute   Date/Time Value Ref Range Status   02/03/2025 12:04 PM 1.10 (L) 1.20 - 4.80 x10*3/uL Final   02/05/2024 02:25 PM 1.45 1.20 - 4.80 x10*3/uL Final   06/18/2021 09:51 AM 2.51 1.20 - 4.80 x10E9/L Final     Monocytes Absolute   Date/Time Value Ref Range Status   02/03/2025 12:04 PM 0.50 0.10 - 1.00 x10*3/uL Final   02/05/2024 02:25 PM 0.64 0.10 - 1.00 x10*3/uL Final   06/18/2021 09:51 AM 0.69 0.10 - 1.00 x10E9/L Final     Eosinophils Absolute   Date/Time Value Ref Range Status   02/03/2025 12:04 PM 0.15 0.00 - 0.70 x10*3/uL Final   02/05/2024 02:25 PM 0.23 0.00 - 0.70 x10*3/uL Final   06/18/2021 09:51 AM 0.33 0.00 - 0.70 x10E9/L Final     Basophils Absolute   Date/Time Value Ref Range Status   02/03/2025 12:04 PM 0.06 0.00 - 0.10 x10*3/uL Final   02/05/2024 02:25 PM 0.03 0.00 - 0.10 x10*3/uL Final   06/18/2021 09:51 AM 0.07 0.00 - 0.10 x10E9/L Final       No components found for: \"PT\"  No results found for: \"APTT\"  Medication Documentation Review Audit       Reviewed by Michelle Lopez MA (Medical Assistant) on 02/03/25 at 1155      Medication Order Taking? Sig Documenting Provider Last Dose Status   pantoprazole (ProtoNix) 40 mg EC tablet [id number] No Take 1 tablet (40 mg) " by mouth once daily. Do not crush, chew, or split. Ricco PERLA Arizmendi,  Taking  24 3576   tadalafil (Cialis) 5 mg tablet [id number] Yes Take 1 tablet (5 mg) by mouth once daily. 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 in 2023  -treated with chemoradiation--received 7 cycles of cisplatin 40 mg/m2 weekly (1st dose 2023), radiation therapy received 2023-2023  -last followup with oncologist in [place] was on 10/30/2023-his followup PET did not show any residual disease; his creatinine was 1.7 and he was incidentally found to have a 2.7 hypoattenuating lesion in the lower pole of the left kidney without any metabolic activity--med onc did not feel comfortable ordering renal protocol CT or MRI  -he continues to report dry mouth, sore throat, appetite still not completely 100%; weight stable  -he saw Dr Ramirez on 2024-flex nasolaryngoscopy was done--no mucosal lesions along base of tongue; no significant epiglottis which is likely related to his prior treatment; some nodularity at the base of the epiglottis attachment which may be residual cartilage  -CT neck was done on 2024 showing evaluation oral cavity is limited by streak artifact from dental hardware (I reviewed images myself and do not see the streak artifact, and Chai says he does not have any dental hardware in place); nasopharyngeal and oropharyngeal structures are unremarkable; epiglottis is not identified; soft tissues in this region along the false cords are edematous (post-radiation changes); retropharyngeal and prevertebral soft tissues unremarkable; there are few non specific bilateral neck nodes reactive in etiology; common carotid bifurcations have non stenotic atherosclerotic calcifications bilaterally; thyroid unremarkable; bilateral  parotid and submandibular glands unremarkable; paranasal sinuses and bilateral mastoids are clear  -here for interval followup  -last saw Dr Ramirez on 7/1/2024--he wanted to see patient in 3-4 months later, but there is no order for next appointment; there is an order for US carotids placed by Dr Ramirez on 7/25/2024 but that has not been booked either  -his PCP had placed an order for CT lung screening on 5/13/2024--but that has also not been booked  -limited physical exam was done today--fibrotic changes in neck; no cervical, supraclavicular, axillary adenopathy  -labs to be done today included CBC, COMP, TSH  -results reviewed--wbc 7.8, hgb 14.4, plt 179,000, creatinine 1.8, calcium 9.1, AST 16, ALT 18, CEA pending  -will see him again in 6 months     2) hypertension  -on lisinopril     3) GERD  -on omeprazole     4) left kidney lesion  -incidentally found on imaging being performed to evaluate head and neck cancer  -as creatinine still elevated, will just perform renal ultrasound  -renal US done on 2/19/2024 showed right kidney 10.7 cm in length, renal cortical echogenicity is within normal limits; no hydronephrosis; no nephrolithiasis; 0.9 cm cyst; left kidney 11.2 cm in length, renal cortical echogenicity is within normal limits; no hydronephrosis; no evidence of nephrolithiasis; multiple renal cysts, largest 2.4 x 1.9 x 2.1 cm  -will recheck renal US 2/2025  -renal US done on 1/30/2025 reviewed--right kidney measures 11.2 cm, right central renal upper pole small hyperechoic focus may represent a nonobstructing calculus; no right hydronephrosis; right renal upper pole ovoid hypoechoic simple cyst 1.1 x 0.8 x 0.8 cm; left kidney measures 11 cm; no shadowing calculus or left hydronephrosis is seen; left renal lower pole adjacent hypoechoic simple cysts 2.9 x 2.2 x 1.8 cm and 1.1 x 1 x 1 cm     Problem List Items Addressed This Visit             ICD-10-CM    Cancer of base of tongue (Multi) C01    Relevant  Orders    Clinic Appointment Request Follow Up; JUAN LOPEZ; [facility]    CBC and Auto Differential    Comprehensive metabolic panel    CEA            Juan Lopez MD

## 2025-02-18 ENCOUNTER — HOSPITAL ENCOUNTER (OUTPATIENT)
Dept: RADIOLOGY | Facility: HOSPITAL | Age: 60
Discharge: HOME | End: 2025-02-18
Payer: COMMERCIAL

## 2025-02-18 DIAGNOSIS — I65.23 ATHEROSCLEROSIS OF BOTH CAROTID ARTERIES: ICD-10-CM

## 2025-02-18 DIAGNOSIS — Z12.2 ENCOUNTER FOR SCREENING FOR LUNG CANCER: ICD-10-CM

## 2025-02-18 PROCEDURE — 71271 CT THORAX LUNG CANCER SCR C-: CPT

## 2025-02-18 PROCEDURE — 93880 EXTRACRANIAL BILAT STUDY: CPT

## 2025-02-18 PROCEDURE — 93880 EXTRACRANIAL BILAT STUDY: CPT | Performed by: RADIOLOGY

## 2025-02-18 PROCEDURE — 71271 CT THORAX LUNG CANCER SCR C-: CPT | Performed by: RADIOLOGY

## 2025-02-25 ENCOUNTER — APPOINTMENT (OUTPATIENT)
Dept: PRIMARY CARE | Facility: CLINIC | Age: 60
End: 2025-02-25
Payer: COMMERCIAL

## 2025-02-28 ENCOUNTER — OFFICE VISIT (OUTPATIENT)
Dept: PRIMARY CARE | Facility: CLINIC | Age: 60
End: 2025-02-28
Payer: COMMERCIAL

## 2025-02-28 VITALS
OXYGEN SATURATION: 96 % | RESPIRATION RATE: 14 BRPM | HEIGHT: 70 IN | DIASTOLIC BLOOD PRESSURE: 82 MMHG | WEIGHT: 198 LBS | TEMPERATURE: 97.6 F | BODY MASS INDEX: 28.35 KG/M2 | SYSTOLIC BLOOD PRESSURE: 140 MMHG | HEART RATE: 80 BPM

## 2025-02-28 DIAGNOSIS — Z72.0 TOBACCO ABUSE DISORDER: ICD-10-CM

## 2025-02-28 DIAGNOSIS — R35.0 BENIGN PROSTATIC HYPERPLASIA WITH URINARY FREQUENCY: ICD-10-CM

## 2025-02-28 DIAGNOSIS — N40.1 BENIGN PROSTATIC HYPERPLASIA WITH URINARY FREQUENCY: ICD-10-CM

## 2025-02-28 DIAGNOSIS — N52.9 ERECTILE DYSFUNCTION, UNSPECIFIED ERECTILE DYSFUNCTION TYPE: ICD-10-CM

## 2025-02-28 DIAGNOSIS — N28.1 RENAL CYST: ICD-10-CM

## 2025-02-28 DIAGNOSIS — R73.9 ELEVATED BLOOD SUGAR: ICD-10-CM

## 2025-02-28 DIAGNOSIS — J43.9 PULMONARY EMPHYSEMA, UNSPECIFIED EMPHYSEMA TYPE (MULTI): ICD-10-CM

## 2025-02-28 DIAGNOSIS — K21.00 GASTROESOPHAGEAL REFLUX DISEASE WITH ESOPHAGITIS WITHOUT HEMORRHAGE: ICD-10-CM

## 2025-02-28 DIAGNOSIS — N18.31 STAGE 3A CHRONIC KIDNEY DISEASE (MULTI): ICD-10-CM

## 2025-02-28 DIAGNOSIS — Z00.00 PERIODIC HEALTH ASSESSMENT, GENERAL SCREENING, ADULT: ICD-10-CM

## 2025-02-28 DIAGNOSIS — I10 PRIMARY HYPERTENSION: Primary | ICD-10-CM

## 2025-02-28 DIAGNOSIS — C01 CANCER OF BASE OF TONGUE (MULTI): ICD-10-CM

## 2025-02-28 PROCEDURE — 99396 PREV VISIT EST AGE 40-64: CPT | Performed by: INTERNAL MEDICINE

## 2025-02-28 PROCEDURE — 3008F BODY MASS INDEX DOCD: CPT | Performed by: INTERNAL MEDICINE

## 2025-02-28 PROCEDURE — 3079F DIAST BP 80-89 MM HG: CPT | Performed by: INTERNAL MEDICINE

## 2025-02-28 PROCEDURE — 3077F SYST BP >= 140 MM HG: CPT | Performed by: INTERNAL MEDICINE

## 2025-02-28 RX ORDER — LISINOPRIL 10 MG/1
10 TABLET ORAL DAILY
Qty: 100 TABLET | Refills: 3 | Status: SHIPPED | OUTPATIENT
Start: 2025-02-28 | End: 2026-04-04

## 2025-02-28 RX ORDER — BUPROPION HYDROCHLORIDE 150 MG/1
150 TABLET ORAL EVERY MORNING
Qty: 30 TABLET | Refills: 11 | Status: SHIPPED | OUTPATIENT
Start: 2025-02-28 | End: 2026-02-28

## 2025-02-28 RX ORDER — TADALAFIL 5 MG/1
5 TABLET ORAL DAILY
Qty: 90 TABLET | Refills: 3 | Status: SHIPPED | OUTPATIENT
Start: 2025-02-28 | End: 2026-02-28

## 2025-02-28 ASSESSMENT — PATIENT HEALTH QUESTIONNAIRE - PHQ9
1. LITTLE INTEREST OR PLEASURE IN DOING THINGS: NOT AT ALL
2. FEELING DOWN, DEPRESSED OR HOPELESS: NOT AT ALL
SUM OF ALL RESPONSES TO PHQ9 QUESTIONS 1 AND 2: 0

## 2025-02-28 ASSESSMENT — ENCOUNTER SYMPTOMS
SHORTNESS OF BREATH: 0
WHEEZING: 0
COUGH: 0
PALPITATIONS: 0
DIARRHEA: 0
CONSTIPATION: 0
NAUSEA: 0
ABDOMINAL PAIN: 0

## 2025-02-28 NOTE — PROGRESS NOTES
"Subjective   Patient ID: Chai Buitrago is a 59 y.o. male who presents for Annual Exam.    Overall doing well.  Patient is fairly active.  Denies any issues with CP,SOB or dizzy spells.  No issues with anxiety, depression or sleep related problems. Denies any issues with HA, numbness or tingling.  No issues or changes with bowel habits.  He does complain of frequency and nocturia, but better with daily cialis.     Review of Systems   Respiratory:  Negative for cough, shortness of breath and wheezing.    Cardiovascular:  Negative for chest pain and palpitations.   Gastrointestinal:  Negative for abdominal pain, constipation, diarrhea and nausea.   ROS is otherwise unremarkable.       Objective   /82 (BP Location: Left arm, Patient Position: Sitting, BP Cuff Size: Adult)   Pulse 80   Temp 36.4 °C (97.6 °F) (Tympanic)   Resp 14   Ht 1.778 m (5' 10\")   Wt 89.8 kg (198 lb)   SpO2 96%   BMI 28.41 kg/m²     Physical Exam  Vitals reviewed.   Constitutional:       Appearance: Normal appearance.   HENT:      Head: Normocephalic.   Eyes:      Pupils: Pupils are equal, round, and reactive to light.   Cardiovascular:      Rate and Rhythm: Normal rate and regular rhythm.   Pulmonary:      Effort: Pulmonary effort is normal.      Breath sounds: Normal breath sounds.   Musculoskeletal:         General: Normal range of motion.   Neurological:      General: No focal deficit present.      Mental Status: He is alert.   Psychiatric:         Mood and Affect: Mood normal.         Assessment/Plan   Problem List Items Addressed This Visit             ICD-10-CM    Cancer of base of tongue (Multi) C01    Primary hypertension - Primary I10    Relevant Medications    lisinopril 10 mg tablet    Gastroesophageal reflux disease with esophagitis without hemorrhage K21.00    Pulmonary emphysema, unspecified emphysema type (Multi) J43.9     Other Visit Diagnoses         Codes    Periodic health assessment, general screening, adult     Z00.00 "    Renal cyst     N28.1    Tobacco abuse disorder     Z72.0    Relevant Medications    buPROPion XL (Wellbutrin XL) 150 mg 24 hr tablet    Elevated blood sugar     R73.9    Relevant Orders    Hemoglobin A1C    Stage 3a chronic kidney disease (Multi)     N18.31    Relevant Orders    Basic Metabolic Panel    Benign prostatic hyperplasia with urinary frequency     N40.1, R35.0    Relevant Medications    tadalafil (Cialis) 5 mg tablet    Erectile dysfunction, unspecified erectile dysfunction type     N52.9    Relevant Medications    tadalafil (Cialis) 5 mg tablet        Physical exam is unremarkable.  We reviewed and discussed all the above.  We discussed current medications as well as most recent test results.  Wants to wait on colonoscopy until on medicare this coming May  BPH / ED - cialis daily.  We discussed the importance and benefits of a healthy diet that is both low in sugars and low in saturated fats.  We reviewed and discussed the benefits of regular physical exercise especially when at or above a level of 150 minutes/week.  We also discussed the importance of stress management and good sleep hygiene.  Discussed patient's current blood pressure and discussed goal blood pressure of 120/80.  We discussed the benefit of low salt diet and regular physical activity of at least 150 min/week.  We discussed checking their blood pressure outside of the office 2-3 x/week.  Patient is to document their results and notify the office if blood pressure results are regularly over 130/85.  All questions answered.     We will continue to work on lifestyle improvements and follow-up in 3  months, sooner if any issues should arise.

## 2025-03-07 DIAGNOSIS — K21.00 GASTROESOPHAGEAL REFLUX DISEASE WITH ESOPHAGITIS WITHOUT HEMORRHAGE: ICD-10-CM

## 2025-03-07 PROCEDURE — RXMED WILLOW AMBULATORY MEDICATION CHARGE

## 2025-03-07 RX ORDER — PANTOPRAZOLE SODIUM 40 MG/1
40 TABLET, DELAYED RELEASE ORAL DAILY
Qty: 30 TABLET | Refills: 5 | Status: SHIPPED | OUTPATIENT
Start: 2025-03-07 | End: 2025-09-03

## 2025-03-08 ENCOUNTER — PHARMACY VISIT (OUTPATIENT)
Dept: PHARMACY | Facility: CLINIC | Age: 60
End: 2025-03-08
Payer: COMMERCIAL

## 2025-04-09 ENCOUNTER — APPOINTMENT (OUTPATIENT)
Facility: CLINIC | Age: 60
End: 2025-04-09
Payer: COMMERCIAL

## 2025-04-09 VITALS — HEIGHT: 70 IN | BODY MASS INDEX: 28.49 KG/M2 | WEIGHT: 199 LBS

## 2025-04-09 DIAGNOSIS — C01 CANCER OF BASE OF TONGUE (MULTI): Primary | ICD-10-CM

## 2025-04-09 PROCEDURE — 99213 OFFICE O/P EST LOW 20 MIN: CPT | Performed by: OTOLARYNGOLOGY

## 2025-04-09 PROCEDURE — 31575 DIAGNOSTIC LARYNGOSCOPY: CPT | Performed by: OTOLARYNGOLOGY

## 2025-04-09 PROCEDURE — 3008F BODY MASS INDEX DOCD: CPT | Performed by: OTOLARYNGOLOGY

## 2025-04-09 ASSESSMENT — PAIN SCALES - GENERAL: PAINLEVEL_OUTOF10: 0-NO PAIN

## 2025-04-09 NOTE — PROGRESS NOTES
ENT Outpatient Consultation    Chief Complaint: Establish care for base of tongue cancer.  History Of Present Illness  Chai Buitrago is a 59 y.o. male presents for evaluation of right neck pain.  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 CT scan was obtained after his initial visit and did not show any concerning findings    4/9/25: Patient returns for follow-up.  He is overall doing well.  No concerning throat symptoms.  He continues to smoke but is transitioning to using a vape pen.  Denies pain, change in swallowing, change in voice  Past Medical History  He has no past medical history on file.    Surgical History  He has a past surgical history that includes Other surgical history (10/07/2019).     Social History  He reports that he has been smoking cigarettes. He has never used smokeless tobacco. He reports current alcohol use of about 2.0 standard drinks of alcohol per week. He reports current drug use. Drug: Marijuana.    Family History  No family history on file.     Allergies  Patient has no known allergies.     Physical Exam:  CONSTITUTIONAL:  No acute distress  VOICE: Mild hoarseness  RESPIRATION:  Breathing comfortably, no stridor  CV:  No clubbing/cyanosis/edema in hands  EYES:  EOM intact, sclera normal  NEURO:  Alert and oriented times 3  HEAD AND FACE:  Symmetric facial features, no masses or lesions, sinuses non-tender to palpation  SALIVARY GLANDS:  Parotid and submandibular glands normal bilaterally  EARS:  Normal external ears, external auditory canals, and TMs to otoscopy, normal hearing to whispered voice.  NOSE:  External nose midline, anterior rhinoscopy is normal with limited  "visualization to the anterior aspect of the interior turbinates, no bleeding or drainage, no lesions  ORAL CAVITY/OROPHARYNX/LIPS:  Normal mucous membranes, normal floor of mouth/tongue/OP, no masses or lesions  PHARYNGEAL WALLS:  No masses or lesions  NECK/LYMPH: Mild lymphedema of the soft tissues of the neck.  Tenderness and fullness along the right posterior lateral neck.  Difficult to palpate lymph nodes due to fibrosis no thyroid masses, trachea midline  SKIN:  Neck skin is without scar or injury  PSYCH:  Alert and oriented with appropriate mood and affect    Procedure Note: Flexible Nasolaryngoscopy  Verbal informed consent was obtained from the patient/patient's guardian. 4% lidocaine mixed with phenylephrine was prepared and dripped into the nose. It was placed in the right naris. Following an appropriate amount of time to allow for adequate anesthesia, a flexible fiberoptic nasolaryngoscope was placed into the patient's right naris. The nasal cavity, nasopharynx, oropharynx, hypopharynx, and all endolaryngeal structures were visualized and were normal except as listed below. Significant findings included:  -No obvious mucosal lesions along the base of tongue.  The epiglottis is truncated which is likely related to his prior treatment.  There is some nodularity at the base of the epiglottis attachment which may be residual cartilage.  No other obvious mucosal lesions.  This is all stable in appearance       Last Recorded Vitals  Height 1.778 m (5' 10\"), weight 90.3 kg (199 lb).    Relevant Results  Reviewed prior notes from Dr. Lopez    Assessment and Plan  59 y.o. 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  Posttreatment PET scan was negative    - No evidence of disease on exam  - Up-to-date on lab work and imaging  - Advised him that I was leaving .  He will continue surveillance with Dr." Elie or one of my other partners    Christian Ramirez MD

## 2025-04-10 PROCEDURE — RXMED WILLOW AMBULATORY MEDICATION CHARGE

## 2025-04-11 ENCOUNTER — PHARMACY VISIT (OUTPATIENT)
Dept: PHARMACY | Facility: CLINIC | Age: 60
End: 2025-04-11
Payer: COMMERCIAL

## 2025-05-06 ENCOUNTER — LAB (OUTPATIENT)
Dept: LAB | Facility: CLINIC | Age: 60
End: 2025-05-06
Payer: COMMERCIAL

## 2025-05-06 LAB
ANION GAP SERPL CALC-SCNC: 11 MMOL/L (ref 10–20)
BUN SERPL-MCNC: 9 MG/DL (ref 6–23)
CALCIUM SERPL-MCNC: 9.4 MG/DL (ref 8.6–10.3)
CHLORIDE SERPL-SCNC: 101 MMOL/L (ref 98–107)
CO2 SERPL-SCNC: 27 MMOL/L (ref 21–32)
CREAT SERPL-MCNC: 1.95 MG/DL (ref 0.5–1.3)
EGFRCR SERPLBLD CKD-EPI 2021: 39 ML/MIN/1.73M*2
EST. AVERAGE GLUCOSE BLD GHB EST-MCNC: 103 MG/DL
GLUCOSE SERPL-MCNC: 118 MG/DL (ref 74–99)
HBA1C MFR BLD: 5.2 % (ref ?–5.7)
POTASSIUM SERPL-SCNC: 4.4 MMOL/L (ref 3.5–5.3)
SODIUM SERPL-SCNC: 135 MMOL/L (ref 136–145)

## 2025-05-06 PROCEDURE — 83036 HEMOGLOBIN GLYCOSYLATED A1C: CPT | Performed by: INTERNAL MEDICINE

## 2025-05-06 PROCEDURE — 80048 BASIC METABOLIC PNL TOTAL CA: CPT | Performed by: INTERNAL MEDICINE

## 2025-05-07 PROCEDURE — RXMED WILLOW AMBULATORY MEDICATION CHARGE

## 2025-05-08 ENCOUNTER — PHARMACY VISIT (OUTPATIENT)
Dept: PHARMACY | Facility: CLINIC | Age: 60
End: 2025-05-08
Payer: COMMERCIAL

## 2025-05-21 ENCOUNTER — APPOINTMENT (OUTPATIENT)
Dept: PRIMARY CARE | Facility: CLINIC | Age: 60
End: 2025-05-21
Payer: COMMERCIAL

## 2025-05-23 ENCOUNTER — APPOINTMENT (OUTPATIENT)
Dept: PRIMARY CARE | Facility: CLINIC | Age: 60
End: 2025-05-23
Payer: COMMERCIAL

## 2025-05-23 VITALS
SYSTOLIC BLOOD PRESSURE: 130 MMHG | WEIGHT: 199.4 LBS | HEART RATE: 86 BPM | BODY MASS INDEX: 28.55 KG/M2 | OXYGEN SATURATION: 97 % | RESPIRATION RATE: 16 BRPM | HEIGHT: 70 IN | DIASTOLIC BLOOD PRESSURE: 80 MMHG

## 2025-05-23 DIAGNOSIS — C01 CANCER OF BASE OF TONGUE (MULTI): ICD-10-CM

## 2025-05-23 DIAGNOSIS — N18.32 STAGE 3B CHRONIC KIDNEY DISEASE (MULTI): ICD-10-CM

## 2025-05-23 DIAGNOSIS — R35.0 BENIGN PROSTATIC HYPERPLASIA WITH URINARY FREQUENCY: ICD-10-CM

## 2025-05-23 DIAGNOSIS — N40.1 BENIGN PROSTATIC HYPERPLASIA WITH URINARY FREQUENCY: ICD-10-CM

## 2025-05-23 DIAGNOSIS — J43.9 PULMONARY EMPHYSEMA, UNSPECIFIED EMPHYSEMA TYPE (MULTI): ICD-10-CM

## 2025-05-23 DIAGNOSIS — Z87.891 SMOKING HISTORY: ICD-10-CM

## 2025-05-23 DIAGNOSIS — I10 PRIMARY HYPERTENSION: Primary | ICD-10-CM

## 2025-05-23 DIAGNOSIS — Z00.00 PERIODIC HEALTH ASSESSMENT, GENERAL SCREENING, ADULT: ICD-10-CM

## 2025-05-23 PROCEDURE — RXMED WILLOW AMBULATORY MEDICATION CHARGE

## 2025-05-23 RX ORDER — AMLODIPINE BESYLATE 5 MG/1
5 TABLET ORAL DAILY
Qty: 30 TABLET | Refills: 5 | Status: SHIPPED | OUTPATIENT
Start: 2025-05-23 | End: 2025-11-19

## 2025-05-23 RX ORDER — METOPROLOL SUCCINATE 50 MG/1
50 TABLET, EXTENDED RELEASE ORAL DAILY
Qty: 30 TABLET | Refills: 5 | Status: SHIPPED | OUTPATIENT
Start: 2025-05-23 | End: 2025-11-19

## 2025-05-23 ASSESSMENT — PATIENT HEALTH QUESTIONNAIRE - PHQ9
2. FEELING DOWN, DEPRESSED OR HOPELESS: NOT AT ALL
1. LITTLE INTEREST OR PLEASURE IN DOING THINGS: NOT AT ALL
SUM OF ALL RESPONSES TO PHQ9 QUESTIONS 1 AND 2: 0

## 2025-05-23 ASSESSMENT — PAIN SCALES - GENERAL: PAINLEVEL_OUTOF10: 0-NO PAIN

## 2025-05-23 ASSESSMENT — ENCOUNTER SYMPTOMS: HYPERTENSION: 1

## 2025-05-23 NOTE — PROGRESS NOTES
"Subjective   Patient ID: Chai Buitrago is a 59 y.o. male who presents for Follow-up and Hypertension.    Hypertension  This is a recurrent problem. The current episode started more than 1 year ago. The problem has been gradually improving since onset. The problem is controlled. There are no associated agents to hypertension. Risk factors for coronary artery disease include male gender. Past treatments include ACE inhibitors. The current treatment provides mild improvement. There are no compliance problems.         Review of Systems   All other systems reviewed and are negative.      Objective   /80   Pulse 86   Resp 16   Ht 1.778 m (5' 10\")   Wt 90.4 kg (199 lb 6.4 oz)   SpO2 97%   BMI 28.61 kg/m²     Physical Exam  Vitals reviewed.   Constitutional:       Appearance: Normal appearance.   HENT:      Head: Normocephalic.   Cardiovascular:      Rate and Rhythm: Normal rate.   Pulmonary:      Effort: Pulmonary effort is normal.   Musculoskeletal:         General: Normal range of motion.   Neurological:      General: No focal deficit present.      Mental Status: He is alert.   Psychiatric:         Mood and Affect: Mood normal.         Assessment/Plan   Problem List Items Addressed This Visit           ICD-10-CM    Cancer of base of tongue (Multi) C01    Primary hypertension - Primary I10    Relevant Medications    amLODIPine (Norvasc) 5 mg tablet    metoprolol succinate XL (Toprol-XL) 50 mg 24 hr tablet    Pulmonary emphysema, unspecified emphysema type (Multi) J43.9     Other Visit Diagnoses         Codes      Stage 3b chronic kidney disease (Multi)     N18.32    Relevant Orders    Basic Metabolic Panel    Albumin-Creatinine Ratio, Urine Random    Vascular US renal artery duplex complete      Smoking history     Z87.891    Relevant Orders    CT lung screening low dose      Benign prostatic hyperplasia with urinary frequency     N40.1, R35.0      Periodic health assessment, general screening, adult     Z00.00 "    Relevant Orders    Colonoscopy Screening; Average Risk Patient        History of oral carcinoma and he continues to smoke.  We stressed the need to quit smoking both for this as well as underlying COPD.  His pulmonary status is stable.  He is due for lung cancer screening CT and he is agreeable to proceed.  History of hypertension which is controlled; however, he has had elevated Creatinine which is climbing.  He has been taking extra lisinopril for elevated BP at home.    This raises concern for possible SANDY - we will stop his ACE-I and start him on norvasc and toprol.    We will have him hydrate over the weekend and recehck BMP in a few days.    Also BPH symptoms - renal US ok.  He does not wish to start medication for this presently.   We will see him back in July - sooner if any issues.

## 2025-06-06 ENCOUNTER — PHARMACY VISIT (OUTPATIENT)
Dept: PHARMACY | Facility: CLINIC | Age: 60
End: 2025-06-06
Payer: COMMERCIAL

## 2025-06-09 PROCEDURE — RXMED WILLOW AMBULATORY MEDICATION CHARGE

## 2025-06-12 ENCOUNTER — PHARMACY VISIT (OUTPATIENT)
Dept: PHARMACY | Facility: CLINIC | Age: 60
End: 2025-06-12
Payer: COMMERCIAL

## 2025-06-30 PROCEDURE — RXMED WILLOW AMBULATORY MEDICATION CHARGE

## 2025-07-07 ENCOUNTER — PHARMACY VISIT (OUTPATIENT)
Dept: PHARMACY | Facility: CLINIC | Age: 60
End: 2025-07-07
Payer: COMMERCIAL

## 2025-07-07 PROCEDURE — RXMED WILLOW AMBULATORY MEDICATION CHARGE

## 2025-07-21 ENCOUNTER — HOSPITAL ENCOUNTER (OUTPATIENT)
Dept: RADIOLOGY | Facility: HOSPITAL | Age: 60
Discharge: HOME | End: 2025-07-21
Payer: COMMERCIAL

## 2025-07-21 DIAGNOSIS — N18.32 STAGE 3B CHRONIC KIDNEY DISEASE (MULTI): ICD-10-CM

## 2025-07-21 PROCEDURE — 93975 VASCULAR STUDY: CPT

## 2025-07-22 ENCOUNTER — LAB (OUTPATIENT)
Dept: LAB | Facility: CLINIC | Age: 60
End: 2025-07-22
Payer: COMMERCIAL

## 2025-07-22 DIAGNOSIS — C01 CANCER OF BASE OF TONGUE (MULTI): ICD-10-CM

## 2025-07-22 LAB
ALBUMIN SERPL BCP-MCNC: 4.4 G/DL (ref 3.4–5)
ALP SERPL-CCNC: 32 U/L (ref 33–120)
ALT SERPL W P-5'-P-CCNC: 14 U/L (ref 10–52)
ANION GAP SERPL CALC-SCNC: 10 MMOL/L (ref 10–20)
AST SERPL W P-5'-P-CCNC: 14 U/L (ref 9–39)
BASOPHILS # BLD AUTO: 0.07 X10*3/UL (ref 0–0.1)
BASOPHILS NFR BLD AUTO: 0.9 %
BILIRUB SERPL-MCNC: 0.4 MG/DL (ref 0–1.2)
BUN SERPL-MCNC: 13 MG/DL (ref 6–23)
CALCIUM SERPL-MCNC: 9.3 MG/DL (ref 8.6–10.3)
CEA SERPL-MCNC: 7.5 UG/L
CHLORIDE SERPL-SCNC: 100 MMOL/L (ref 98–107)
CO2 SERPL-SCNC: 29 MMOL/L (ref 21–32)
CREAT SERPL-MCNC: 1.82 MG/DL (ref 0.5–1.3)
CREAT UR-MCNC: 55.8 MG/DL (ref 20–370)
EGFRCR SERPLBLD CKD-EPI 2021: 43 ML/MIN/1.73M*2
EOSINOPHIL # BLD AUTO: 0.3 X10*3/UL (ref 0–0.7)
EOSINOPHIL NFR BLD AUTO: 4 %
ERYTHROCYTE [DISTWIDTH] IN BLOOD BY AUTOMATED COUNT: 13 % (ref 11.5–14.5)
GLUCOSE SERPL-MCNC: 151 MG/DL (ref 74–99)
HCT VFR BLD AUTO: 45.6 % (ref 41–52)
HGB BLD-MCNC: 15.6 G/DL (ref 13.5–17.5)
IMM GRANULOCYTES # BLD AUTO: 0.04 X10*3/UL (ref 0–0.7)
IMM GRANULOCYTES NFR BLD AUTO: 0.5 % (ref 0–0.9)
LYMPHOCYTES # BLD AUTO: 1.75 X10*3/UL (ref 1.2–4.8)
LYMPHOCYTES NFR BLD AUTO: 23.1 %
MCH RBC QN AUTO: 33.3 PG (ref 26–34)
MCHC RBC AUTO-ENTMCNC: 34.2 G/DL (ref 32–36)
MCV RBC AUTO: 97 FL (ref 80–100)
MICROALBUMIN UR-MCNC: <7 MG/L
MICROALBUMIN/CREAT UR: NORMAL MG/G{CREAT}
MONOCYTES # BLD AUTO: 0.44 X10*3/UL (ref 0.1–1)
MONOCYTES NFR BLD AUTO: 5.8 %
NEUTROPHILS # BLD AUTO: 4.98 X10*3/UL (ref 1.2–7.7)
NEUTROPHILS NFR BLD AUTO: 65.7 %
NRBC BLD-RTO: NORMAL /100{WBCS}
PLATELET # BLD AUTO: 178 X10*3/UL (ref 150–450)
POTASSIUM SERPL-SCNC: 4.1 MMOL/L (ref 3.5–5.3)
PROT SERPL-MCNC: 6.8 G/DL (ref 6.4–8.2)
RBC # BLD AUTO: 4.69 X10*6/UL (ref 4.5–5.9)
SODIUM SERPL-SCNC: 135 MMOL/L (ref 136–145)
WBC # BLD AUTO: 7.6 X10*3/UL (ref 4.4–11.3)

## 2025-07-22 PROCEDURE — 82378 CARCINOEMBRYONIC ANTIGEN: CPT

## 2025-07-22 PROCEDURE — 80053 COMPREHEN METABOLIC PANEL: CPT

## 2025-07-22 PROCEDURE — 82043 UR ALBUMIN QUANTITATIVE: CPT | Performed by: INTERNAL MEDICINE

## 2025-07-22 PROCEDURE — 36415 COLL VENOUS BLD VENIPUNCTURE: CPT

## 2025-07-22 PROCEDURE — 85025 COMPLETE CBC W/AUTO DIFF WBC: CPT

## 2025-07-23 ENCOUNTER — APPOINTMENT (OUTPATIENT)
Dept: PRIMARY CARE | Facility: CLINIC | Age: 60
End: 2025-07-23
Payer: COMMERCIAL

## 2025-07-23 VITALS
DIASTOLIC BLOOD PRESSURE: 80 MMHG | TEMPERATURE: 97.6 F | HEART RATE: 58 BPM | HEIGHT: 70 IN | RESPIRATION RATE: 14 BRPM | SYSTOLIC BLOOD PRESSURE: 124 MMHG | BODY MASS INDEX: 29.2 KG/M2 | WEIGHT: 204 LBS | OXYGEN SATURATION: 97 %

## 2025-07-23 DIAGNOSIS — I10 PRIMARY HYPERTENSION: Primary | ICD-10-CM

## 2025-07-23 DIAGNOSIS — N18.32 STAGE 3B CHRONIC KIDNEY DISEASE (MULTI): ICD-10-CM

## 2025-07-23 DIAGNOSIS — C10.9 MALIGNANT NEOPLASM OF OROPHARYNX: ICD-10-CM

## 2025-07-23 DIAGNOSIS — R73.9 ELEVATED BLOOD SUGAR: ICD-10-CM

## 2025-07-23 DIAGNOSIS — R53.83 OTHER FATIGUE: ICD-10-CM

## 2025-07-23 DIAGNOSIS — Z13.220 SCREENING FOR HYPERLIPIDEMIA: ICD-10-CM

## 2025-07-23 PROCEDURE — 3079F DIAST BP 80-89 MM HG: CPT | Performed by: INTERNAL MEDICINE

## 2025-07-23 PROCEDURE — 3008F BODY MASS INDEX DOCD: CPT | Performed by: INTERNAL MEDICINE

## 2025-07-23 PROCEDURE — 99214 OFFICE O/P EST MOD 30 MIN: CPT | Performed by: INTERNAL MEDICINE

## 2025-07-23 PROCEDURE — 3074F SYST BP LT 130 MM HG: CPT | Performed by: INTERNAL MEDICINE

## 2025-07-23 ASSESSMENT — ENCOUNTER SYMPTOMS
CONSTIPATION: 0
ABDOMINAL PAIN: 0
PALPITATIONS: 0
DIARRHEA: 0
NAUSEA: 0
COUGH: 0
SHORTNESS OF BREATH: 0

## 2025-07-23 NOTE — PROGRESS NOTES
"Subjective   Patient ID: Chai Buitrago is a 59 y.o. male who presents for Hypertension (Follow up / test results.).    Overall has been feeling well.  He denies any complaints.  We did review his medications been taking these as directed.  He denies any issues with chest pain, shortness of breath or dizzy spells.  We also did review his recent blood test including his renal function and his previous kidney ultrasound.  He has had his renal artery duplex but this has not been officially interpreted by radiology yet.    Review of Systems   Respiratory:  Negative for cough and shortness of breath.    Cardiovascular:  Negative for chest pain and palpitations.   Gastrointestinal:  Negative for abdominal pain, constipation, diarrhea and nausea.       Objective   /80 (BP Location: Left arm, Patient Position: Sitting, BP Cuff Size: Adult)   Pulse 58   Temp 36.4 °C (97.6 °F) (Tympanic)   Resp 14   Ht 1.778 m (5' 10\")   Wt 92.5 kg (204 lb)   SpO2 97%   BMI 29.27 kg/m²     Physical Exam  Vitals reviewed.   Constitutional:       Appearance: Normal appearance.   HENT:      Head: Normocephalic.     Cardiovascular:      Rate and Rhythm: Normal rate and regular rhythm.   Pulmonary:      Effort: Pulmonary effort is normal.      Breath sounds: Normal breath sounds.     Musculoskeletal:         General: Normal range of motion.     Neurological:      General: No focal deficit present.      Mental Status: He is alert.     Psychiatric:         Mood and Affect: Mood normal.         Assessment/Plan   Problem List Items Addressed This Visit           ICD-10-CM    Primary hypertension - Primary I10    Relevant Orders    Comprehensive Metabolic Panel     Other Visit Diagnoses         Codes      Malignant neoplasm of oropharynx     C10.9      Screening for hyperlipidemia     Z13.220    Relevant Orders    Lipid Panel      Other fatigue     R53.83    Relevant Orders    Thyroid Stimulating Hormone      Elevated blood sugar     R73.9    " Relevant Orders    Hemoglobin A1C      Stage 3b chronic kidney disease (Multi)     N18.32    Relevant Orders    CBC        Comp [facility] medical patient with underlying hypertension and chronic kidney disease as well as oropharyngeal cancer.  Of most concern lately recently since CKD.  He is asymptomatic in regards to this.  He did have a worsening of this but seems to be at a new baseline.  Renal ultrasound was unremarkable.  There is been no recent progression or worsening.  We did have a renal artery duplex scan performed due to his CKD as well as his uncontrolled hypertension.  Currently his blood pressure is under control and renal artery stenosis seems unlikely.  The report did come back after patient left the office.  This was unremarkable.  Will follow-up in 6 months now, sooner if any issues or concerns

## 2025-08-01 ENCOUNTER — PHARMACY VISIT (OUTPATIENT)
Dept: PHARMACY | Facility: CLINIC | Age: 60
End: 2025-08-01
Payer: COMMERCIAL

## 2025-08-01 PROCEDURE — RXMED WILLOW AMBULATORY MEDICATION CHARGE

## 2025-08-01 RX ORDER — TADALAFIL 5 MG/1
5 TABLET ORAL DAILY
Qty: 270 TABLET | Refills: 0 | OUTPATIENT
Start: 2025-05-08

## 2025-08-02 ENCOUNTER — PHARMACY VISIT (OUTPATIENT)
Dept: PHARMACY | Facility: CLINIC | Age: 60
End: 2025-08-02
Payer: COMMERCIAL

## 2025-08-04 ENCOUNTER — OFFICE VISIT (OUTPATIENT)
Dept: HEMATOLOGY/ONCOLOGY | Facility: CLINIC | Age: 60
End: 2025-08-04
Payer: COMMERCIAL

## 2025-08-04 VITALS
TEMPERATURE: 97.5 F | SYSTOLIC BLOOD PRESSURE: 140 MMHG | DIASTOLIC BLOOD PRESSURE: 89 MMHG | RESPIRATION RATE: 16 BRPM | BODY MASS INDEX: 29.07 KG/M2 | HEART RATE: 63 BPM | OXYGEN SATURATION: 97 % | WEIGHT: 202.6 LBS

## 2025-08-04 DIAGNOSIS — K21.00 GASTROESOPHAGEAL REFLUX DISEASE WITH ESOPHAGITIS WITHOUT HEMORRHAGE: ICD-10-CM

## 2025-08-04 DIAGNOSIS — C01 CANCER OF BASE OF TONGUE (MULTI): Primary | ICD-10-CM

## 2025-08-04 DIAGNOSIS — N28.9 KIDNEY LESION, NATIVE, LEFT: ICD-10-CM

## 2025-08-04 DIAGNOSIS — I10 PRIMARY HYPERTENSION: ICD-10-CM

## 2025-08-04 PROCEDURE — 3079F DIAST BP 80-89 MM HG: CPT | Performed by: INTERNAL MEDICINE

## 2025-08-04 PROCEDURE — 99214 OFFICE O/P EST MOD 30 MIN: CPT | Performed by: INTERNAL MEDICINE

## 2025-08-04 PROCEDURE — 3077F SYST BP >= 140 MM HG: CPT | Performed by: INTERNAL MEDICINE

## 2025-08-04 ASSESSMENT — ENCOUNTER SYMPTOMS
MUSCULOSKELETAL NEGATIVE: 1
EYES NEGATIVE: 1
ENDOCRINE NEGATIVE: 1
CONSTITUTIONAL NEGATIVE: 1
PSYCHIATRIC NEGATIVE: 1
NEUROLOGICAL NEGATIVE: 1
CARDIOVASCULAR NEGATIVE: 1
HEMATOLOGIC/LYMPHATIC NEGATIVE: 1
RESPIRATORY NEGATIVE: 1
GASTROINTESTINAL NEGATIVE: 1

## 2025-08-04 ASSESSMENT — PAIN SCALES - GENERAL: PAINLEVEL_OUTOF10: 0-NO PAIN

## 2025-08-04 NOTE — PROGRESS NOTES
Patient ID: Chai Buitrago is a 59 y.o. male.  Referring Physician: Juan Lopez MD  [id number] [facility] [address]  [place] [address]  Primary Care Provider: Ricco Arizmendi DO  Visit Type: Follow Up      Subjective    HPI I was woken up this morning by a bad cramp in my right neck    Review of Systems   Constitutional: Negative.    HENT:  Negative.     Eyes: Negative.    Respiratory: Negative.     Cardiovascular: Negative.    Gastrointestinal: Negative.    Endocrine: Negative.    Genitourinary: Negative.     Musculoskeletal: Negative.    Skin: Negative.    Neurological: Negative.    Hematological: Negative.    Psychiatric/Behavioral: Negative.          Objective   BSA: 2.13 meters squared  /89 (BP Location: Left arm, Patient Position: Sitting, BP Cuff Size: Adult)   Pulse 63   Temp 36.4 °C (97.5 °F) (Temporal)   Resp 16   Wt 91.9 kg (202 lb 9.6 oz)   SpO2 97%   BMI 29.07 kg/m²      has a past medical history of Allergic (Seasonal), Anxiety, Depression, Esophageal cancer (Multi) (3/2023), GERD (gastroesophageal reflux disease) (Years), and Hypertension.   has a past surgical history that includes Other surgical history (10/07/2019); Appendectomy (1970s); Esophagogastroduodenoscopy; and Vasectomy (2002).  Family History[1]  Oncology History    No history exists.       Chai Buitrago  reports that he has been smoking cigarettes. He has a 30 pack-year smoking history. He has never used smokeless tobacco.  He  reports current alcohol use of about 9.0 standard drinks of alcohol per week.  He  reports current drug use. Drug: Marijuana.    Physical Exam  Vitals reviewed.   Constitutional:       Appearance: Normal appearance.   HENT:      Head: Normocephalic.      Mouth/Throat:      Mouth: Mucous membranes are moist.     Eyes:      Extraocular Movements: Extraocular movements intact.      Pupils: Pupils are equal, round, and reactive to light.       Cardiovascular:      Rate and Rhythm: Normal rate and  regular rhythm.      Pulses: Normal pulses.      Heart sounds: Normal heart sounds.   Pulmonary:      Effort: Pulmonary effort is normal.      Breath sounds: Normal breath sounds.   Abdominal:      General: Abdomen is flat.      Palpations: Abdomen is soft.     Musculoskeletal:         General: Normal range of motion.      Cervical back: Rigidity present.     Skin:     General: Skin is warm.     Neurological:      General: No focal deficit present.      Mental Status: He is alert and oriented to person, place, and time.     Psychiatric:         Mood and Affect: Mood normal.         Behavior: Behavior normal.         WBC   Date/Time Value Ref Range Status   07/22/2025 09:20 AM 7.6 4.4 - 11.3 x10*3/uL Final   02/03/2025 12:04 PM 7.8 4.4 - 11.3 x10*3/uL Final   07/16/2024 10:53 AM 6.2 4.4 - 11.3 x10*3/uL Final     nRBC   Date Value Ref Range Status   07/22/2025   Final     Comment:     Not Measured   07/16/2024 0.0 0.0 - 0.0 /100 WBCs Final     RBC   Date Value Ref Range Status   07/22/2025 4.69 4.50 - 5.90 x10*6/uL Final   02/03/2025 4.44 (L) 4.50 - 5.90 x10*6/uL Final   07/16/2024 3.93 (L) 4.50 - 5.90 x10*6/uL Final     Hemoglobin   Date Value Ref Range Status   07/22/2025 15.6 13.5 - 17.5 g/dL Final   02/03/2025 14.4 13.5 - 17.5 g/dL Final   07/16/2024 13.0 (L) 13.5 - 17.5 g/dL Final     Hematocrit   Date Value Ref Range Status   07/22/2025 45.6 41.0 - 52.0 % Final   02/03/2025 41.3 41.0 - 52.0 % Final   07/16/2024 37.4 (L) 41.0 - 52.0 % Final     MCV   Date/Time Value Ref Range Status   07/22/2025 09:20 AM 97 80 - 100 fL Final   02/03/2025 12:04 PM 93 80 - 100 fL Final   07/16/2024 10:53 AM 95 80 - 100 fL Final     MCH   Date/Time Value Ref Range Status   07/22/2025 09:20 AM 33.3 26.0 - 34.0 pg Final   02/03/2025 12:04 PM 32.4 26.0 - 34.0 pg Final   07/16/2024 10:53 AM 33.1 26.0 - 34.0 pg Final     MCHC   Date/Time Value Ref Range Status   07/22/2025 09:20 AM 34.2 32.0 - 36.0 g/dL Final   02/03/2025 12:04 PM 34.9  "32.0 - 36.0 g/dL Final   07/16/2024 10:53 AM 34.8 32.0 - 36.0 g/dL Final     RDW   Date/Time Value Ref Range Status   07/22/2025 09:20 AM 13.0 11.5 - 14.5 % Final   02/03/2025 12:04 PM 12.7 11.5 - 14.5 % Final   07/16/2024 10:53 AM 12.4 11.5 - 14.5 % Final     Platelets   Date/Time Value Ref Range Status   07/22/2025 09:20  150 - 450 x10*3/uL Final   02/03/2025 12:04  150 - 450 x10*3/uL Final   07/16/2024 10:53  150 - 450 x10*3/uL Final     No results found for: \"MPV\"  Neutrophils %   Date/Time Value Ref Range Status   07/22/2025 09:20 AM 65.7 40.0 - 80.0 % Final   02/03/2025 12:04 PM 76.7 40.0 - 80.0 % Final   02/05/2024 02:25 PM 73.6 40.0 - 80.0 % Final     Immature Granulocytes %, Automated   Date/Time Value Ref Range Status   07/22/2025 09:20 AM 0.5 0.0 - 0.9 % Final     Comment:     Immature Granulocyte Count (IG) includes promyelocytes, myelocytes and metamyelocytes but does not include bands. Percent differential counts (%) should be interpreted in the context of the absolute cell counts (cells/UL).   02/03/2025 12:04 PM 0.1 0.0 - 0.9 % Final     Comment:     Immature Granulocyte Count (IG) includes promyelocytes, myelocytes and metamyelocytes but does not include bands. Percent differential counts (%) should be interpreted in the context of the absolute cell counts (cells/UL).   02/05/2024 02:25 PM 0.0 0.0 - 0.9 % Final     Comment:     Immature Granulocyte Count (IG) includes promyelocytes, myelocytes and metamyelocytes but does not include bands. Percent differential counts (%) should be interpreted in the context of the absolute cell counts (cells/UL).     Lymphocytes %   Date/Time Value Ref Range Status   07/22/2025 09:20 AM 23.1 13.0 - 44.0 % Final   02/03/2025 12:04 PM 14.1 13.0 - 44.0 % Final   02/05/2024 02:25 PM 16.3 13.0 - 44.0 % Final     Monocytes %   Date/Time Value Ref Range Status   07/22/2025 09:20 AM 5.8 2.0 - 10.0 % Final   02/03/2025 12:04 PM 6.4 2.0 - 10.0 % Final "   02/05/2024 02:25 PM 7.2 2.0 - 10.0 % Final     Eosinophils %   Date/Time Value Ref Range Status   07/22/2025 09:20 AM 4.0 0.0 - 6.0 % Final   02/03/2025 12:04 PM 1.9 0.0 - 6.0 % Final   02/05/2024 02:25 PM 2.6 0.0 - 6.0 % Final     Basophils %   Date/Time Value Ref Range Status   07/22/2025 09:20 AM 0.9 0.0 - 2.0 % Final   02/03/2025 12:04 PM 0.8 0.0 - 2.0 % Final   02/05/2024 02:25 PM 0.3 0.0 - 2.0 % Final     Neutrophils Absolute   Date/Time Value Ref Range Status   07/22/2025 09:20 AM 4.98 1.20 - 7.70 x10*3/uL Final     Comment:     Percent differential counts (%) should be interpreted in the context of the absolute cell counts (cells/uL).   02/03/2025 12:04 PM 6.00 1.20 - 7.70 x10*3/uL Final     Comment:     Percent differential counts (%) should be interpreted in the context of the absolute cell counts (cells/uL).   02/05/2024 02:25 PM 6.57 1.20 - 7.70 x10*3/uL Final     Comment:     Percent differential counts (%) should be interpreted in the context of the absolute cell counts (cells/uL).     Immature Granulocytes Absolute, Automated   Date/Time Value Ref Range Status   07/22/2025 09:20 AM 0.04 0.00 - 0.70 x10*3/uL Final   02/03/2025 12:04 PM 0.01 0.00 - 0.70 x10*3/uL Final   02/05/2024 02:25 PM 0.00 0.00 - 0.70 x10*3/uL Final     Lymphocytes Absolute   Date/Time Value Ref Range Status   07/22/2025 09:20 AM 1.75 1.20 - 4.80 x10*3/uL Final   02/03/2025 12:04 PM 1.10 (L) 1.20 - 4.80 x10*3/uL Final   02/05/2024 02:25 PM 1.45 1.20 - 4.80 x10*3/uL Final     Monocytes Absolute   Date/Time Value Ref Range Status   07/22/2025 09:20 AM 0.44 0.10 - 1.00 x10*3/uL Final   02/03/2025 12:04 PM 0.50 0.10 - 1.00 x10*3/uL Final   02/05/2024 02:25 PM 0.64 0.10 - 1.00 x10*3/uL Final     Eosinophils Absolute   Date/Time Value Ref Range Status   07/22/2025 09:20 AM 0.30 0.00 - 0.70 x10*3/uL Final   02/03/2025 12:04 PM 0.15 0.00 - 0.70 x10*3/uL Final   02/05/2024 02:25 PM 0.23 0.00 - 0.70 x10*3/uL Final     Basophils Absolute  "  Date/Time Value Ref Range Status   07/22/2025 09:20 AM 0.07 0.00 - 0.10 x10*3/uL Final   02/03/2025 12:04 PM 0.06 0.00 - 0.10 x10*3/uL Final   02/05/2024 02:25 PM 0.03 0.00 - 0.10 x10*3/uL Final       No components found for: \"PT\"  No results found for: \"APTT\"  Medication Documentation Review Audit       Reviewed by Lala Crespo MA (Medical Assistant) on 08/04/25 at 0933      Medication Order Taking? Sig Documenting Provider Last Dose Status   amLODIPine (Norvasc) 5 mg tablet [id number] Yes Take 1 tablet (5 mg) by mouth once daily. Ricco Arizmendi DO  Active   buPROPion XL (Wellbutrin XL) 150 mg 24 hr tablet [id number] Yes Take 1 tablet (150 mg) by mouth once daily in the morning. Do not crush, chew, or split. Ricco Arizmendi DO  Active   metoprolol succinate XL (Toprol-XL) 50 mg 24 hr tablet [id number] Yes Take 1 tablet (50 mg) by mouth once daily. Do not crush or chew. Ricco Arizmendi DO  Active   pantoprazole (ProtoNix) 40 mg EC tablet [id number] Yes Take 1 tablet (40 mg) by mouth once daily. Do not crush, chew, or split. Ricco Arizmendi DO  Active   tadalafil (Cialis) 5 mg tablet [id number] Yes Take 1 tablet (5 mg) by mouth once daily. Ricco Arizmendi DO  Active   tadalafil (Cialis) 5 mg tablet [id number] Yes Take 1 tablet (5 mg) by mouth once daily. 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 in 4/2023  -treated with chemoradiation--received 7 cycles of cisplatin 40 mg/m2 weekly (1st dose 5/8/2023), radiation therapy received 5/26/2023-7/20/2023  -last followup with oncologist in [place] was on 10/30/2023-his followup PET did not show any residual disease; his creatinine was 1.7 and he was incidentally found to have a 2.7 hypoattenuating lesion in the lower pole of the left kidney without any metabolic activity--med onc " did not feel comfortable ordering renal protocol CT or MRI  -he continues to report dry mouth, sore throat, appetite still not completely 100%; weight stable  -he saw Dr Ramirez on 7/1/2024-flex nasolaryngoscopy was done--no mucosal lesions along base of tongue; no significant epiglottis which is likely related to his prior treatment; some nodularity at the base of the epiglottis attachment which may be residual cartilage  -CT neck was done on 7/16/2024 showing evaluation oral cavity is limited by streak artifact from dental hardware (I reviewed images myself and do not see the streak artifact, and Chai says he does not have any dental hardware in place); nasopharyngeal and oropharyngeal structures are unremarkable; epiglottis is not identified; soft tissues in this region along the false cords are edematous (post-radiation changes); retropharyngeal and prevertebral soft tissues unremarkable; there are few non specific bilateral neck nodes reactive in etiology; common carotid bifurcations have non stenotic atherosclerotic calcifications bilaterally; thyroid unremarkable; bilateral parotid and submandibular glands unremarkable; paranasal sinuses and bilateral mastoids are clear  -here for interval followup  -last saw Dr Ramirez on 4/9/2025-nasal cavity, nasopharynx, oropharynx, hypopharynx, and all endolaryngeal structures were visualized; no obvious mucosal lesions along base of tongue; epiglottis is truncated related to prior treatment; some nodularity at the base of the epiglottis attachment which may be residual cartilage; no other obvious mucosal lesions  -he will see Dr Delgadillo on 10/28/2025  -he was woken up this morning by a bad cramp in his right neck--that lasted several minutes, then resolved--most likely due to radiation induced fibrosis of his neck structures/muscles  -labs done on 7/22/2025  included CBC, COMP, CEA  -results reviewed--wbc 7.6, hgb 15.6, plt 178,000, creatinine 1.82, calcium 9.3, AST 14,  total bili 0.4, ALT 14, CEA 7.5  -CT lung screening done on 2/18/2025 reviewed-mild to moderate emphysema with upper lung zone predilection and generalized airway thickening; no discrete worrisome pulmonary nodule; no intrathoracic lymphadenopathy  -will see him again in 6 months     2) hypertension  -on lisinopril     3) GERD  -on omeprazole     4) left kidney lesion  -incidentally found on imaging being performed to evaluate head and neck cancer  -as creatinine still elevated, will just perform renal ultrasound  -renal US done on 2/19/2024 showed right kidney 10.7 cm in length, renal cortical echogenicity is within normal limits; no hydronephrosis; no nephrolithiasis; 0.9 cm cyst; left kidney 11.2 cm in length, renal cortical echogenicity is within normal limits; no hydronephrosis; no evidence of nephrolithiasis; multiple renal cysts, largest 2.4 x 1.9 x 2.1 cm  -will recheck renal US 2/2025  -renal US done on 1/30/2025 reviewed--right kidney measures 11.2 cm, right central renal upper pole small hyperechoic focus may represent a nonobstructing calculus; no right hydronephrosis; right renal upper pole ovoid hypoechoic simple cyst 1.1 x 0.8 x 0.8 cm; left kidney measures 11 cm; no shadowing calculus or left hydronephrosis is seen; left renal lower pole adjacent hypoechoic simple cysts 2.9 x 2.2 x 1.8 cm and 1.1 x 1 x 1 cm  -7/21/2025 renal vascular US reviewed--right kidney without shadowing calculus or right hydronephrosis; right renal upper pole round hypoechoic 9 mm cyst is stable; left kidney -no shadowing calculus or left hydronephrosis; left renal lower pole ovoid hypoechoic cyst 2.6 x 2.4 x 2.1 cm similar to prior as well as adjacent left renal lower pole 1.1 cm ovoid hypoechoic cyst; no sonographic evidence to suggest significant renal artery stenosis        Problem List Items Addressed This Visit           ICD-10-CM    Cancer of base of tongue (Multi) C01            Juan Lopez,  MD                                [1]   Family History  Problem Relation Name Age of Onset    Diabetes Mother [place]     Hypertension Mother [place]     Kidney disease Mother [place]     Miscarriages / Stillbirths Mother [place]     Stroke Mother [place]     Kidney disease Mother [place]     Diabetes Mother [place]     Hypertension Mother [place]     Stroke Mother [place]     Miscarriages / Stillbirths Mother [place]     Diabetes Mother [place]     Hypertension Mother [place]     Stroke Mother [place]     Diabetes Mother [place]     Hypertension Mother [place]     Stroke Mother [place]

## 2025-08-29 PROCEDURE — RXMED WILLOW AMBULATORY MEDICATION CHARGE

## 2025-09-02 ENCOUNTER — PHARMACY VISIT (OUTPATIENT)
Dept: PHARMACY | Facility: CLINIC | Age: 60
End: 2025-09-02
Payer: COMMERCIAL

## 2025-09-04 DIAGNOSIS — K21.00 GASTROESOPHAGEAL REFLUX DISEASE WITH ESOPHAGITIS WITHOUT HEMORRHAGE: ICD-10-CM

## 2025-09-04 RX ORDER — PANTOPRAZOLE SODIUM 40 MG/1
40 TABLET, DELAYED RELEASE ORAL DAILY
Qty: 30 TABLET | Refills: 5 | Status: SHIPPED | OUTPATIENT
Start: 2025-09-04 | End: 2026-03-03

## 2025-10-15 ENCOUNTER — APPOINTMENT (OUTPATIENT)
Dept: GASTROENTEROLOGY | Facility: EXTERNAL LOCATION | Age: 60
End: 2025-10-15
Payer: MEDICARE

## 2025-10-22 ENCOUNTER — APPOINTMENT (OUTPATIENT)
Dept: PRIMARY CARE | Facility: CLINIC | Age: 60
End: 2025-10-22
Payer: COMMERCIAL

## 2025-10-28 ENCOUNTER — APPOINTMENT (OUTPATIENT)
Facility: CLINIC | Age: 60
End: 2025-10-28
Payer: COMMERCIAL